# Patient Record
Sex: FEMALE | Race: WHITE | NOT HISPANIC OR LATINO | Employment: OTHER | URBAN - METROPOLITAN AREA
[De-identification: names, ages, dates, MRNs, and addresses within clinical notes are randomized per-mention and may not be internally consistent; named-entity substitution may affect disease eponyms.]

---

## 2023-03-04 ENCOUNTER — DOCUMENTATION (OUTPATIENT)
Dept: PRIMARY CARE | Facility: CLINIC | Age: 88
End: 2023-03-04

## 2023-03-04 ASSESSMENT — ENCOUNTER SYMPTOMS
SWEATS: 0
ORTHOPNEA: 0
NECK PAIN: 0
HYPERTENSION: 1

## 2023-03-04 NOTE — PROGRESS NOTES
This encounter was created in error - please disregard.  Subjective   Chief complaint: Liseth Briggs is a 89 y.o. female who is a long term resident patient being seen and evaluated for multiple medical problems.  Patient presents for     HPI:  Hypertension  Pertinent negatives include no neck pain, orthopnea or sweats.       Review of Systems   Cardiovascular:  Negative for orthopnea.   Musculoskeletal:  Negative for neck pain.     All systems reviewed and negative except for what was mentioned in the HPI    Vital signs:      Objective   Physical Exam  Constitutional:       General: She is not in acute distress.  Eyes:      Extraocular Movements: Extraocular movements intact.   Cardiovascular:      Rate and Rhythm: Regular rhythm.   Pulmonary:      Effort: Pulmonary effort is normal.      Breath sounds: Normal breath sounds.   Abdominal:      General: Bowel sounds are normal.      Palpations: Abdomen is soft.   Musculoskeletal:      Cervical back: Neck supple.      Right lower le+ Pitting Edema present.      Left lower le+ Pitting Edema present.   Neurological:      Mental Status: She is alert.   Psychiatric:         Mood and Affect: Mood normal.         Behavior: Behavior is cooperative.         Assessment/Plan   Problem List Items Addressed This Visit    None    Medications, treatments, and labs reviewed  Continue medications and treatments as listed in PCC

## 2023-03-22 ENCOUNTER — NURSING HOME VISIT (OUTPATIENT)
Dept: POST ACUTE CARE | Facility: EXTERNAL LOCATION | Age: 88
End: 2023-03-22
Payer: MEDICARE

## 2023-03-22 DIAGNOSIS — I10 HYPERTENSION, UNSPECIFIED TYPE: ICD-10-CM

## 2023-03-22 DIAGNOSIS — F32.A DEPRESSION, UNSPECIFIED DEPRESSION TYPE: ICD-10-CM

## 2023-03-22 DIAGNOSIS — F03.918 DEMENTIA WITH OTHER BEHAVIORAL DISTURBANCE, UNSPECIFIED DEMENTIA SEVERITY, UNSPECIFIED DEMENTIA TYPE (MULTI): ICD-10-CM

## 2023-03-22 PROBLEM — F03.90 DEMENTIA (MULTI): Status: ACTIVE | Noted: 2023-03-22

## 2023-03-22 PROCEDURE — 99309 SBSQ NF CARE MODERATE MDM 30: CPT | Performed by: INTERNAL MEDICINE

## 2023-03-22 NOTE — PROGRESS NOTES
Subjective   Chief complaint: Liseth Briggs is a 89 y.o. female who is a long term resident patient being seen and evaluated for multiple medical problems.  Patient presents for chronic illness management    HPI:  Patient presents for general medical care and f/u.  Patient seen and examined at bedside.  No issues per nursing.  Patient has no acute complaints. Patient with dementia requires assistance for ADLs and mobility. Denies feeling down or thoughts of harming self or others. Denies headache or chest pain. No acute distress.           Review of Systems  All systems reviewed and negative except for what was mentioned in the HPI    Vital signs: 121/72, 67, 17, 95%    Objective   Physical Exam  Constitutional:       General: She is not in acute distress.  Eyes:      Extraocular Movements: Extraocular movements intact.   Cardiovascular:      Rate and Rhythm: Regular rhythm.   Pulmonary:      Effort: Pulmonary effort is normal.      Breath sounds: Normal breath sounds.   Abdominal:      General: Bowel sounds are normal.      Palpations: Abdomen is soft.   Musculoskeletal:      Cervical back: Neck supple.      Right lower leg: No edema.      Left lower leg: No edema.   Neurological:      Mental Status: She is alert.   Psychiatric:         Mood and Affect: Mood normal.         Behavior: Behavior is cooperative.         Assessment/Plan   Problem List Items Addressed This Visit          Nervous    Dementia (CMS/HCC)     Continue Seroquel            Circulatory    HTN (hypertension)     Monitor BP   Continue antihypertensives            Other    Depression     Mood stable  Continue Zoloft          Medications, treatments, and labs reviewed  Continue medications and treatments as listed in PCC    Scribe Attestation  By signing my name below, Lelo FORD, Scribdestini   attest that this documentation has been prepared under the direction and in the presence of Velasquez Talley MD.    Provider Attestation - Scribe  documentation  All medical record entries made by the Scribe were at my direction and personally dictated by me. I have reviewed the chart and agree that the record accurately reflects my personal performance of the history, physical exam, discussion and plan.

## 2023-03-22 NOTE — LETTER
Patient: Liseth Briggs  : 1920    Encounter Date: 2023    Subjective  Chief complaint: Liseth Briggs is a 89 y.o. female who is a long term resident patient being seen and evaluated for multiple medical problems.  Patient presents for chronic illness management    HPI:  Patient presents for general medical care and f/u.  Patient seen and examined at bedside.  No issues per nursing.  Patient has no acute complaints. Patient with dementia requires assistance for ADLs and mobility. Denies feeling down or thoughts of harming self or others. Denies headache or chest pain. No acute distress.           Review of Systems  All systems reviewed and negative except for what was mentioned in the HPI    Vital signs: 121/72, 67, 17, 95%    Objective  Physical Exam  Constitutional:       General: She is not in acute distress.  Eyes:      Extraocular Movements: Extraocular movements intact.   Cardiovascular:      Rate and Rhythm: Regular rhythm.   Pulmonary:      Effort: Pulmonary effort is normal.      Breath sounds: Normal breath sounds.   Abdominal:      General: Bowel sounds are normal.      Palpations: Abdomen is soft.   Musculoskeletal:      Cervical back: Neck supple.      Right lower leg: No edema.      Left lower leg: No edema.   Neurological:      Mental Status: She is alert.   Psychiatric:         Mood and Affect: Mood normal.         Behavior: Behavior is cooperative.         Assessment/Plan  Problem List Items Addressed This Visit          Nervous    Dementia (CMS/HCC)     Continue Seroquel            Circulatory    HTN (hypertension)     Monitor BP   Continue antihypertensives            Other    Depression     Mood stable  Continue Zoloft          Medications, treatments, and labs reviewed  Continue medications and treatments as listed in PCC    Scribe Attestation  By signing my name below, ILelo, Scribe   attest that this documentation has been prepared under the direction and in the presence  of Velasquez Talley MD.    Provider Attestation - Scribe documentation  All medical record entries made by the Scribe were at my direction and personally dictated by me. I have reviewed the chart and agree that the record accurately reflects my personal performance of the history, physical exam, discussion and plan.      Electronically Signed By: Velasquez Talley MD   3/22/23  5:37 PM

## 2023-04-26 ENCOUNTER — NURSING HOME VISIT (OUTPATIENT)
Dept: POST ACUTE CARE | Facility: EXTERNAL LOCATION | Age: 88
End: 2023-04-26
Payer: MEDICARE

## 2023-04-26 DIAGNOSIS — F32.A DEPRESSION, UNSPECIFIED DEPRESSION TYPE: ICD-10-CM

## 2023-04-26 DIAGNOSIS — F03.918 DEMENTIA WITH OTHER BEHAVIORAL DISTURBANCE, UNSPECIFIED DEMENTIA SEVERITY, UNSPECIFIED DEMENTIA TYPE (MULTI): ICD-10-CM

## 2023-04-26 DIAGNOSIS — I10 HYPERTENSION, UNSPECIFIED TYPE: ICD-10-CM

## 2023-04-26 PROCEDURE — 99309 SBSQ NF CARE MODERATE MDM 30: CPT | Performed by: INTERNAL MEDICINE

## 2023-04-26 NOTE — PROGRESS NOTES
Subjective   Chief complaint: Liseth Briggs is a 89 y.o. female who is a long term resident patient being seen and evaluated for multiple medical problems.  Patient presents for chronic illness management    HPI:  Patient presents for general medical care and f/u.  Patient seen and examined at bedside.  No issues per nursing.  Patient has no acute complaints. Patient with dementia requires assistance for ADLs and mobility. Denies feeling down or thoughts of harming self or others.  patient with diagnosis of depression.  Mood is stable.  Denies feeling down and thoughts of harming self or others. Denies headache or chest pain. No acute distress.           Review of Systems  All systems reviewed and negative except for what was mentioned in the HPI    Vital signs: 124/72, 96%    Objective   Physical Exam  Constitutional:       General: She is not in acute distress.  Eyes:      Extraocular Movements: Extraocular movements intact.   Cardiovascular:      Rate and Rhythm: Normal rate and regular rhythm.   Pulmonary:      Effort: Pulmonary effort is normal.      Breath sounds: Normal breath sounds.   Abdominal:      General: Bowel sounds are normal.      Palpations: Abdomen is soft.   Musculoskeletal:      Cervical back: Neck supple.      Right lower leg: No edema.      Left lower leg: No edema.   Neurological:      Mental Status: She is alert.   Psychiatric:         Mood and Affect: Mood normal.         Behavior: Behavior is cooperative.         Assessment/Plan   Problem List Items Addressed This Visit          Nervous    Dementia (CMS/[facility])     Continue Seroquel            Circulatory    HTN (hypertension)     Monitor BP   Continue antihypertensives            Other    Depression     Mood stable  Continue Zoloft        Medications, treatments, and labs reviewed  Continue medications and treatments as listed in PCC    Scribe Attestation  By signing my name below, ILelo, Scribe   attest that this documentation has  been prepared under the direction and in the presence of Velasquez Talley MD.    Provider Attestation - Scribe documentation  All medical record entries made by the Scribe were at my direction and personally dictated by me. I have reviewed the chart and agree that the record accurately reflects my personal performance of the history, physical exam, discussion and plan.

## 2023-04-26 NOTE — LETTER
Patient: Liseth Briggs  : 1920    Encounter Date: 2023    Subjective  Chief complaint: Liseth Briggs is a 89 y.o. female who is a long term resident patient being seen and evaluated for multiple medical problems.  Patient presents for chronic illness management    HPI:  Patient presents for general medical care and f/u.  Patient seen and examined at bedside.  No issues per nursing.  Patient has no acute complaints. Patient with dementia requires assistance for ADLs and mobility. Denies feeling down or thoughts of harming self or others.  patient with diagnosis of depression.  Mood is stable.  Denies feeling down and thoughts of harming self or others. Denies headache or chest pain. No acute distress.           Review of Systems  All systems reviewed and negative except for what was mentioned in the HPI    Vital signs: 124/72, 96%    Objective  Physical Exam  Constitutional:       General: She is not in acute distress.  Eyes:      Extraocular Movements: Extraocular movements intact.   Cardiovascular:      Rate and Rhythm: Normal rate and regular rhythm.   Pulmonary:      Effort: Pulmonary effort is normal.      Breath sounds: Normal breath sounds.   Abdominal:      General: Bowel sounds are normal.      Palpations: Abdomen is soft.   Musculoskeletal:      Cervical back: Neck supple.      Right lower leg: No edema.      Left lower leg: No edema.   Neurological:      Mental Status: She is alert.   Psychiatric:         Mood and Affect: Mood normal.         Behavior: Behavior is cooperative.         Assessment/Plan  Problem List Items Addressed This Visit          Nervous    Dementia (CMS/HCC)     Continue Seroquel            Circulatory    HTN (hypertension)     Monitor BP   Continue antihypertensives            Other    Depression     Mood stable  Continue Zoloft        Medications, treatments, and labs reviewed  Continue medications and treatments as listed in PCC    Scribe Attestation  By signing my name  below, I, Clemente Bishopibdestini   attest that this documentation has been prepared under the direction and in the presence of Velasquez Talley MD.    Provider Attestation - Scribe documentation  All medical record entries made by the Scribe were at my direction and personally dictated by me. I have reviewed the chart and agree that the record accurately reflects my personal performance of the history, physical exam, discussion and plan.      Electronically Signed By: Velasquez Talley MD   4/26/23  6:01 PM

## 2023-05-24 ENCOUNTER — NURSING HOME VISIT (OUTPATIENT)
Dept: POST ACUTE CARE | Facility: EXTERNAL LOCATION | Age: 88
End: 2023-05-24
Payer: MEDICARE

## 2023-05-24 DIAGNOSIS — F32.A DEPRESSION, UNSPECIFIED DEPRESSION TYPE: ICD-10-CM

## 2023-05-24 DIAGNOSIS — F03.918 DEMENTIA WITH OTHER BEHAVIORAL DISTURBANCE, UNSPECIFIED DEMENTIA SEVERITY, UNSPECIFIED DEMENTIA TYPE (MULTI): ICD-10-CM

## 2023-05-24 PROCEDURE — 99309 SBSQ NF CARE MODERATE MDM 30: CPT | Performed by: INTERNAL MEDICINE

## 2023-05-24 NOTE — LETTER
Patient: Liseth Briggs  : 1920    Encounter Date: 2023    Subjective  Chief complaint: Liseth Briggs is a 89 y.o. female who is a long term resident patient being seen and evaluated for multiple medical problems.  Patient presents for chronic illness management    HPI:  Patient presents for general medical care and f/u.  Patient seen and examined at bedside.  No issues per nursing.  Patient has no acute complaints. Patient with dementia requires assistance for ADLs and mobility. Denies feeling down or thoughts of harming self or others.  patient with diagnosis of depression.  Mood is stable.  Denies feeling down and thoughts of harming self or others. Denies headache or chest pain. No acute distress.           Review of Systems  All systems reviewed and negative except for what was mentioned in the HPI    Vital signs: 126/74, 96%    Objective  Physical Exam  Constitutional:       General: She is not in acute distress.  Eyes:      Extraocular Movements: Extraocular movements intact.   Cardiovascular:      Rate and Rhythm: Normal rate and regular rhythm.   Pulmonary:      Effort: Pulmonary effort is normal.      Breath sounds: Normal breath sounds.   Abdominal:      General: Bowel sounds are normal.      Palpations: Abdomen is soft.   Musculoskeletal:      Cervical back: Neck supple.      Right lower leg: No edema.      Left lower leg: No edema.   Neurological:      Mental Status: She is alert.   Psychiatric:         Mood and Affect: Mood normal.         Behavior: Behavior is cooperative.         Assessment/Plan  Problem List Items Addressed This Visit          Nervous    Dementia (CMS/HCC)     Continue Seroquel  Continue to monitor for changes in mentation and behaviors            Other    Depression     Mood stable  Continue Zoloft        Medications, treatments, and labs reviewed  Continue medications and treatments as listed in PCC    Scribe Attestation  By signing my name below, I, Lelo Shafer,  Scribe   attest that this documentation has been prepared under the direction and in the presence of Velasquez Talley MD.    Provider Attestation - Scribe documentation  All medical record entries made by the Scribe were at my direction and personally dictated by me. I have reviewed the chart and agree that the record accurately reflects my personal performance of the history, physical exam, discussion and plan.      Electronically Signed By: Velasquez Talley MD   5/25/23  2:46 PM

## 2023-05-25 NOTE — PROGRESS NOTES
Subjective   Chief complaint: Liseth Briggs is a 89 y.o. female who is a long term resident patient being seen and evaluated for multiple medical problems.  Patient presents for chronic illness management    HPI:  Patient presents for general medical care and f/u.  Patient seen and examined at bedside.  No issues per nursing.  Patient has no acute complaints. Patient with dementia requires assistance for ADLs and mobility. Denies feeling down or thoughts of harming self or others.  patient with diagnosis of depression.  Mood is stable.  Denies feeling down and thoughts of harming self or others. Denies headache or chest pain. No acute distress.           Review of Systems  All systems reviewed and negative except for what was mentioned in the HPI    Vital signs: 126/74, 96%    Objective   Physical Exam  Constitutional:       General: She is not in acute distress.  Eyes:      Extraocular Movements: Extraocular movements intact.   Cardiovascular:      Rate and Rhythm: Normal rate and regular rhythm.   Pulmonary:      Effort: Pulmonary effort is normal.      Breath sounds: Normal breath sounds.   Abdominal:      General: Bowel sounds are normal.      Palpations: Abdomen is soft.   Musculoskeletal:      Cervical back: Neck supple.      Right lower leg: No edema.      Left lower leg: No edema.   Neurological:      Mental Status: She is alert.   Psychiatric:         Mood and Affect: Mood normal.         Behavior: Behavior is cooperative.         Assessment/Plan   Problem List Items Addressed This Visit          Nervous    Dementia (CMS/[facility])     Continue Seroquel  Continue to monitor for changes in mentation and behaviors            Other    Depression     Mood stable  Continue Zoloft        Medications, treatments, and labs reviewed  Continue medications and treatments as listed in PCC    Scribe Attestation  By signing my name below, ILelo, Scribdestini   attest that this documentation has been prepared under the  direction and in the presence of Velasquez Talley MD.    Provider Attestation - Scribe documentation  All medical record entries made by the Scribe were at my direction and personally dictated by me. I have reviewed the chart and agree that the record accurately reflects my personal performance of the history, physical exam, discussion and plan.

## 2023-06-23 ENCOUNTER — NURSING HOME VISIT (OUTPATIENT)
Dept: POST ACUTE CARE | Facility: EXTERNAL LOCATION | Age: 88
End: 2023-06-23
Payer: MEDICARE

## 2023-06-23 DIAGNOSIS — F32.A DEPRESSION, UNSPECIFIED DEPRESSION TYPE: ICD-10-CM

## 2023-06-23 DIAGNOSIS — I10 HYPERTENSION, UNSPECIFIED TYPE: ICD-10-CM

## 2023-06-23 DIAGNOSIS — F03.918 DEMENTIA WITH OTHER BEHAVIORAL DISTURBANCE, UNSPECIFIED DEMENTIA SEVERITY, UNSPECIFIED DEMENTIA TYPE (MULTI): ICD-10-CM

## 2023-06-23 PROCEDURE — 99309 SBSQ NF CARE MODERATE MDM 30: CPT | Performed by: INTERNAL MEDICINE

## 2023-06-23 NOTE — LETTER
Patient: Liseth Briggs  : 1920    Encounter Date: 2023    Subjective  Chief complaint: Liseth Briggs is a 89 y.o. female who is a long term resident patient being seen and evaluated for multiple medical problems.  Patient presents for chronic illness management    HPI:  Patient presents for general medical care and f/u.  Patient seen and examined at bedside.  No issues per nursing.  Patient has no acute complaints. Patient with dementia requires assistance for ADLs and mobility. patient with diagnosis of depression.  Denies feeling down and thoughts of harming self or others. Denies headache or chest pain. No acute distress.           Review of Systems  All systems reviewed and negative except for what was mentioned in the HPI    Vital signs: 122/74, 97%    Objective  Physical Exam  Constitutional:       General: She is not in acute distress.  Eyes:      Extraocular Movements: Extraocular movements intact.   Cardiovascular:      Rate and Rhythm: Normal rate and regular rhythm.   Pulmonary:      Effort: Pulmonary effort is normal.      Breath sounds: Normal breath sounds.   Abdominal:      General: Bowel sounds are normal.      Palpations: Abdomen is soft.   Musculoskeletal:      Cervical back: Neck supple.      Right lower leg: No edema.      Left lower leg: No edema.   Neurological:      Mental Status: She is alert.   Psychiatric:         Mood and Affect: Mood normal.         Behavior: Behavior is cooperative.         Assessment/Plan  Problem List Items Addressed This Visit          Nervous    Dementia (CMS/HCC)     Continue Seroquel  Continue to monitor for changes in mentation and behaviors            Circulatory    HTN (hypertension)     Monitor BP   Continue antihypertensives            Other    Depression     Mood stable  Continue Zoloft        Medications, treatments, and labs reviewed  Continue medications and treatments as listed in PCC    Scribe Attestation  By signing my name below, Lelo FORD  Aisha Shafer   attest that this documentation has been prepared under the direction and in the presence of Velasquez Talley MD.    Provider Attestation - Scribe documentation  All medical record entries made by the Scribe were at my direction and personally dictated by me. I have reviewed the chart and agree that the record accurately reflects my personal performance of the history, physical exam, discussion and plan.      Electronically Signed By: Velasquez Talley MD   6/23/23  5:45 PM

## 2023-06-23 NOTE — PROGRESS NOTES
Subjective   Chief complaint: Liseth Briggs is a 89 y.o. female who is a long term resident patient being seen and evaluated for multiple medical problems.  Patient presents for chronic illness management    HPI:  Patient presents for general medical care and f/u.  Patient seen and examined at bedside.  No issues per nursing.  Patient has no acute complaints. Patient with dementia requires assistance for ADLs and mobility. patient with diagnosis of depression.  Denies feeling down and thoughts of harming self or others. Denies headache or chest pain. No acute distress.           Review of Systems  All systems reviewed and negative except for what was mentioned in the HPI    Vital signs: 122/74, 97%    Objective   Physical Exam  Constitutional:       General: She is not in acute distress.  Eyes:      Extraocular Movements: Extraocular movements intact.   Cardiovascular:      Rate and Rhythm: Normal rate and regular rhythm.   Pulmonary:      Effort: Pulmonary effort is normal.      Breath sounds: Normal breath sounds.   Abdominal:      General: Bowel sounds are normal.      Palpations: Abdomen is soft.   Musculoskeletal:      Cervical back: Neck supple.      Right lower leg: No edema.      Left lower leg: No edema.   Neurological:      Mental Status: She is alert.   Psychiatric:         Mood and Affect: Mood normal.         Behavior: Behavior is cooperative.         Assessment/Plan   Problem List Items Addressed This Visit          Nervous    Dementia (CMS/HCC)     Continue Seroquel  Continue to monitor for changes in mentation and behaviors            Circulatory    HTN (hypertension)     Monitor BP   Continue antihypertensives            Other    Depression     Mood stable  Continue Zoloft        Medications, treatments, and labs reviewed  Continue medications and treatments as listed in PCC    Scribe Attestation  By signing my name below, ILelo, Scribe   attest that this documentation has been prepared  under the direction and in the presence of Velasquez Talley MD.    Provider Attestation - Scribe documentation  All medical record entries made by the Scribe were at my direction and personally dictated by me. I have reviewed the chart and agree that the record accurately reflects my personal performance of the history, physical exam, discussion and plan.

## 2023-07-24 ENCOUNTER — NURSING HOME VISIT (OUTPATIENT)
Dept: POST ACUTE CARE | Facility: EXTERNAL LOCATION | Age: 88
End: 2023-07-24
Payer: COMMERCIAL

## 2023-07-24 DIAGNOSIS — F01.C11 SEVERE VASCULAR DEMENTIA WITH AGITATION (MULTI): Primary | ICD-10-CM

## 2023-07-24 DIAGNOSIS — F32.A DEPRESSION, UNSPECIFIED DEPRESSION TYPE: ICD-10-CM

## 2023-07-24 DIAGNOSIS — I10 HYPERTENSION, ESSENTIAL: ICD-10-CM

## 2023-07-24 PROCEDURE — 99308 SBSQ NF CARE LOW MDM 20: CPT | Performed by: NURSE PRACTITIONER

## 2023-07-24 NOTE — LETTER
Patient: Liseth Briggs  : 1920    Encounter Date: 2023    PROGRESS NOTE    Subjective  Chief complaint: Liseth Briggs is a 89 y.o. female who is a long term care patient being seen and evaluated for follow-up of dementia with behaviors and depression.    HPI:  I asked to see patient to evaluate continued use of Seroquel and Zoloft.  Patient with Dx of dementia with behaviors and depression.  Mood has been stable. Symptoms controlled.  Patient denies feeling anxious or down.  Denies thoughts of harming self or others.           Objective    Physical Exam  Constitutional:       General: She is not in acute distress.  HENT:      Ears:      Comments: [place]  Eyes:      Comments: Blind   Cardiovascular:      Rate and Rhythm: Normal rate.   Pulmonary:      Effort: Pulmonary effort is normal.   Musculoskeletal:      Cervical back: Neck supple.   Neurological:      Mental Status: She is alert.   Psychiatric:         Mood and Affect: Mood normal.         Behavior: Behavior is cooperative.         Assessment/Plan  Problem List Items Addressed This Visit       Depression     GDR contraindicated, may cause return/worsening of symptoms  Continue current meds         Hypertension, essential     BP usually 130s systolic  Continue antihypertensives  AMIE diet  Monitor BP          Severe vascular dementia with agitation (CMS/[facility]) - Primary     Secured unit for safety  Continue current meds          Medications, treatments, and labs reviewed  Continue medications and treatments as listed in EMR    Scribe Attestation  ILilibeth Scribe   attest that this documentation has been prepared under the direction and in the presence of TERRANCE Timmons-CNP    Provider Attestation - Scribe documentation  All medical record entries made by the Scribe were at my direction and personally dictated by me. I have reviewed the chart and agree that the record accurately reflects my personal performance of the history, physical exam,  discussion and plan.   PETRA Timmons        Electronically Signed By: PETRA Timmons   7/29/23  8:15 PM

## 2023-07-24 NOTE — PROGRESS NOTES
PROGRESS NOTE    Subjective   Chief complaint: Liseth Briggs is a 89 y.o. female who is a long term care patient being seen and evaluated for follow-up of dementia with behaviors and depression.    HPI:  I asked to see patient to evaluate continued use of Seroquel and Zoloft.  Patient with Dx of dementia with behaviors and depression.  Mood has been stable. Symptoms controlled.  Patient denies feeling anxious or down.  Denies thoughts of harming self or others.           Objective     Physical Exam  Constitutional:       General: She is not in acute distress.  HENT:      Ears:      Comments: [place]  Eyes:      Comments: Blind   Cardiovascular:      Rate and Rhythm: Normal rate.   Pulmonary:      Effort: Pulmonary effort is normal.   Musculoskeletal:      Cervical back: Neck supple.   Neurological:      Mental Status: She is alert.   Psychiatric:         Mood and Affect: Mood normal.         Behavior: Behavior is cooperative.         Assessment/Plan   Problem List Items Addressed This Visit       Depression     GDR contraindicated, may cause return/worsening of symptoms  Continue current meds         Hypertension, essential     BP usually 130s systolic  Continue antihypertensives  AMIE diet  Monitor BP          Severe vascular dementia with agitation (CMS/[facility]) - Primary     Secured unit for safety  Continue current meds          Medications, treatments, and labs reviewed  Continue medications and treatments as listed in EMR    Scribe Attestation  ILilibeth Scribe   attest that this documentation has been prepared under the direction and in the presence of PETRA Timmons    Provider Attestation - Scribe documentation  All medical record entries made by the Scribe were at my direction and personally dictated by me. I have reviewed the chart and agree that the record accurately reflects my personal performance of the history, physical exam, discussion and plan.   PETRA Timmons

## 2023-07-26 ENCOUNTER — NURSING HOME VISIT (OUTPATIENT)
Dept: POST ACUTE CARE | Facility: EXTERNAL LOCATION | Age: 88
End: 2023-07-26
Payer: COMMERCIAL

## 2023-07-26 DIAGNOSIS — I10 HYPERTENSION, UNSPECIFIED TYPE: ICD-10-CM

## 2023-07-26 DIAGNOSIS — F03.918 DEMENTIA WITH OTHER BEHAVIORAL DISTURBANCE, UNSPECIFIED DEMENTIA SEVERITY, UNSPECIFIED DEMENTIA TYPE (MULTI): Primary | ICD-10-CM

## 2023-07-26 DIAGNOSIS — F32.A DEPRESSION, UNSPECIFIED DEPRESSION TYPE: ICD-10-CM

## 2023-07-26 PROCEDURE — 99309 SBSQ NF CARE MODERATE MDM 30: CPT | Performed by: INTERNAL MEDICINE

## 2023-07-26 NOTE — PROGRESS NOTES
Subjective   Chief complaint: Liseth Briggs is a 89 y.o. female who is a long term resident patient being seen and evaluated for multiple medical problems.  Patient presents for chronic illness management    HPI:  Patient presents for general medical care and f/u.  Patient seen and examined at bedside.  No issues per nursing.  Patient has no acute complaints. Patient with dementia requires assistance for ADLs and mobility. patient with diagnosis of depression.  Denies feeling down and thoughts of harming self or others. Denies headache or chest pain. No acute distress.           Review of Systems  All systems reviewed and negative except for what was mentioned in the HPI    Vital signs: 126/74, 97%    Objective   Physical Exam  Constitutional:       General: She is not in acute distress.  Eyes:      Extraocular Movements: Extraocular movements intact.   Cardiovascular:      Rate and Rhythm: Normal rate and regular rhythm.   Pulmonary:      Effort: Pulmonary effort is normal.      Breath sounds: Normal breath sounds.   Abdominal:      General: Bowel sounds are normal.      Palpations: Abdomen is soft.   Musculoskeletal:      Cervical back: Neck supple.      Right lower leg: No edema.      Left lower leg: No edema.   Neurological:      Mental Status: She is alert.   Psychiatric:         Mood and Affect: Mood normal.         Behavior: Behavior is cooperative.         Assessment/Plan   Problem List Items Addressed This Visit          Cardiac and Vasculature    HTN (hypertension)     Monitor BP   Continue antihypertensives            Mental Health    Depression     Mood stable  Continue Zoloft            Neuro    Dementia (CMS/[facility]) - Primary     Continue Seroquel  Continue to monitor for changes in mentation and behaviors        Medications, treatments, and labs reviewed  Continue medications and treatments as listed in PCC    Scribe Attestation  By signing my name below, I, Lelo Shafer, Scribe   attest that this  documentation has been prepared under the direction and in the presence of Velasquez Talley MD.    Provider Attestation - Scribe documentation  All medical record entries made by the Scribe were at my direction and personally dictated by me. I have reviewed the chart and agree that the record accurately reflects my personal performance of the history, physical exam, discussion and plan.

## 2023-07-26 NOTE — LETTER
Patient: Liseth Briggs  : 1920    Encounter Date: 2023    Subjective  Chief complaint: Liseth Briggs is a 89 y.o. female who is a long term resident patient being seen and evaluated for multiple medical problems.  Patient presents for chronic illness management    HPI:  Patient presents for general medical care and f/u.  Patient seen and examined at bedside.  No issues per nursing.  Patient has no acute complaints. Patient with dementia requires assistance for ADLs and mobility. patient with diagnosis of depression.  Denies feeling down and thoughts of harming self or others. Denies headache or chest pain. No acute distress.           Review of Systems  All systems reviewed and negative except for what was mentioned in the HPI    Vital signs: 126/74, 97%    Objective  Physical Exam  Constitutional:       General: She is not in acute distress.  Eyes:      Extraocular Movements: Extraocular movements intact.   Cardiovascular:      Rate and Rhythm: Normal rate and regular rhythm.   Pulmonary:      Effort: Pulmonary effort is normal.      Breath sounds: Normal breath sounds.   Abdominal:      General: Bowel sounds are normal.      Palpations: Abdomen is soft.   Musculoskeletal:      Cervical back: Neck supple.      Right lower leg: No edema.      Left lower leg: No edema.   Neurological:      Mental Status: She is alert.   Psychiatric:         Mood and Affect: Mood normal.         Behavior: Behavior is cooperative.         Assessment/Plan  Problem List Items Addressed This Visit          Cardiac and Vasculature    HTN (hypertension)     Monitor BP   Continue antihypertensives            Mental Health    Depression     Mood stable  Continue Zoloft            Neuro    Dementia (CMS/[facility]) - Primary     Continue Seroquel  Continue to monitor for changes in mentation and behaviors        Medications, treatments, and labs reviewed  Continue medications and treatments as listed in PCC    Scribe Attestation  By  signing my name below, I, Aisha Bishop   attest that this documentation has been prepared under the direction and in the presence of Velasquez Talley MD.    Provider Attestation - Scribe documentation  All medical record entries made by the Scribe were at my direction and personally dictated by me. I have reviewed the chart and agree that the record accurately reflects my personal performance of the history, physical exam, discussion and plan.      Electronically Signed By: Velasquez Talley MD   7/26/23  4:45 PM

## 2023-07-29 PROBLEM — F02.C11 SEVERE LATE ONSET ALZHEIMER'S DEMENTIA WITH AGITATION (MULTI): Status: ACTIVE | Noted: 2023-03-22

## 2023-07-29 PROBLEM — G30.1 SEVERE LATE ONSET ALZHEIMER'S DEMENTIA WITH AGITATION (MULTI): Status: ACTIVE | Noted: 2023-03-22

## 2023-07-29 PROBLEM — F01.C11 SEVERE VASCULAR DEMENTIA WITH AGITATION (MULTI): Status: ACTIVE | Noted: 2023-03-22

## 2023-08-23 ENCOUNTER — NURSING HOME VISIT (OUTPATIENT)
Dept: POST ACUTE CARE | Facility: EXTERNAL LOCATION | Age: 88
End: 2023-08-23
Payer: COMMERCIAL

## 2023-08-23 DIAGNOSIS — F01.C11 SEVERE VASCULAR DEMENTIA WITH AGITATION (MULTI): Primary | ICD-10-CM

## 2023-08-23 DIAGNOSIS — F32.A DEPRESSION, UNSPECIFIED DEPRESSION TYPE: ICD-10-CM

## 2023-08-23 DIAGNOSIS — I10 HYPERTENSION, ESSENTIAL: ICD-10-CM

## 2023-08-23 PROCEDURE — 99309 SBSQ NF CARE MODERATE MDM 30: CPT | Performed by: INTERNAL MEDICINE

## 2023-08-23 NOTE — LETTER
Patient: Liseth Briggs  : 1920    Encounter Date: 2023    Subjective  Chief complaint: Liseth Briggs is a 89 y.o. female who is a long term resident patient being seen and evaluated for multiple medical problems.  Patient presents for chronic illness management    HPI:  Patient presents for general medical care and f/u.  Patient seen and examined at bedside.  No issues per nursing.  Patient has no acute complaints. Patient with dementia requires assistance for ADLs and mobility. patient with diagnosis of depression.  Denies feeling down and thoughts of harming self or others. Denies headache or chest pain. No acute distress.           Review of Systems  All systems reviewed and negative except for what was mentioned in the HPI    Vital signs: 126/74, 97%    Objective  Physical Exam  Constitutional:       General: She is not in acute distress.  Eyes:      Extraocular Movements: Extraocular movements intact.   Cardiovascular:      Rate and Rhythm: Normal rate and regular rhythm.   Pulmonary:      Effort: Pulmonary effort is normal.      Breath sounds: Normal breath sounds.   Abdominal:      General: Bowel sounds are normal.      Palpations: Abdomen is soft.   Musculoskeletal:      Cervical back: Neck supple.      Right lower leg: No edema.      Left lower leg: No edema.   Neurological:      Mental Status: She is alert.   Psychiatric:         Mood and Affect: Mood normal.         Behavior: Behavior is cooperative.         Assessment/Plan  Problem List Items Addressed This Visit       Severe vascular dementia with agitation (CMS/HCC) - Primary     Secured unit for safety  Continue current meds         Hypertension, essential     BP usually 130s systolic  Continue antihypertensives  AMIE diet  Monitor BP          Depression     Mood stable  Continue current meds        Medications, treatments, and labs reviewed  Continue medications and treatments as listed in PCC    Scribe Attestation  By signing my name  below, I, Aisha Bishop   attest that this documentation has been prepared under the direction and in the presence of Velasquez Talley MD.    Provider Attestation - Scribe documentation  All medical record entries made by the Scribe were at my direction and personally dictated by me. I have reviewed the chart and agree that the record accurately reflects my personal performance of the history, physical exam, discussion and plan.  1. Severe vascular dementia with agitation (CMS/HCC)        2. Hypertension, essential        3. Depression, unspecified depression type              Electronically Signed By: Velasquez Talley MD   8/28/23  3:25 PM

## 2023-08-28 NOTE — PROGRESS NOTES
Subjective   Chief complaint: Liseth Briggs is a 89 y.o. female who is a long term resident patient being seen and evaluated for multiple medical problems.  Patient presents for chronic illness management    HPI:  Patient presents for general medical care and f/u.  Patient seen and examined at bedside.  No issues per nursing.  Patient has no acute complaints. Patient with dementia requires assistance for ADLs and mobility. patient with diagnosis of depression.  Denies feeling down and thoughts of harming self or others. Denies headache or chest pain. No acute distress.           Review of Systems  All systems reviewed and negative except for what was mentioned in the HPI    Vital signs: 126/74, 97%    Objective   Physical Exam  Constitutional:       General: She is not in acute distress.  Eyes:      Extraocular Movements: Extraocular movements intact.   Cardiovascular:      Rate and Rhythm: Normal rate and regular rhythm.   Pulmonary:      Effort: Pulmonary effort is normal.      Breath sounds: Normal breath sounds.   Abdominal:      General: Bowel sounds are normal.      Palpations: Abdomen is soft.   Musculoskeletal:      Cervical back: Neck supple.      Right lower leg: No edema.      Left lower leg: No edema.   Neurological:      Mental Status: She is alert.   Psychiatric:         Mood and Affect: Mood normal.         Behavior: Behavior is cooperative.         Assessment/Plan   Problem List Items Addressed This Visit       Severe vascular dementia with agitation (CMS/HCC) - Primary     Secured unit for safety  Continue current meds         Hypertension, essential     BP usually 130s systolic  Continue antihypertensives  AMIE diet  Monitor BP          Depression     Mood stable  Continue current meds        Medications, treatments, and labs reviewed  Continue medications and treatments as listed in PCC    Scribe Attestation  By signing my name below, ILelo, Scribe   attest that this documentation has  been prepared under the direction and in the presence of Velasquez Talley MD.    Provider Attestation - Scribe documentation  All medical record entries made by the Scribe were at my direction and personally dictated by me. I have reviewed the chart and agree that the record accurately reflects my personal performance of the history, physical exam, discussion and plan.  1. Severe vascular dementia with agitation (CMS/HCC)        2. Hypertension, essential        3. Depression, unspecified depression type

## 2023-09-13 ENCOUNTER — NURSING HOME VISIT (OUTPATIENT)
Dept: POST ACUTE CARE | Facility: EXTERNAL LOCATION | Age: 88
End: 2023-09-13
Payer: COMMERCIAL

## 2023-09-13 DIAGNOSIS — F01.C11 SEVERE VASCULAR DEMENTIA WITH AGITATION (MULTI): Primary | ICD-10-CM

## 2023-09-13 DIAGNOSIS — I10 HYPERTENSION, ESSENTIAL: ICD-10-CM

## 2023-09-13 PROCEDURE — 99308 SBSQ NF CARE LOW MDM 20: CPT | Performed by: INTERNAL MEDICINE

## 2023-09-13 NOTE — ASSESSMENT & PLAN NOTE
BP usually 130s systolic  Continue antihypertensives  AMIE diet  Monitor BP   
Secured unit for safety  Continue current meds  Discontinue Seroquel due to increased lethargy according to family reports    
yes...

## 2023-09-13 NOTE — LETTER
Patient: Liseth Briggs  : 1920    Encounter Date: 2023    PROGRESS NOTE    Subjective  Chief complaint: Liseth Briggs is a 89 y.o. female who is a long term care patient being seen and evaluated for  general medical care    HPI:  Patient with dementia has been taking Seroquel for severe agitation. Family would like to have Seroquel discontinued because they feel it is making her too sleepy and lethargic. Denies chest pain or headache. No other concerns at this time. No acute distress.       Objective  Vital signs: 132/65, 98%    Physical Exam  Constitutional:       General: She is not in acute distress.  Eyes:      Extraocular Movements: Extraocular movements intact.   Cardiovascular:      Rate and Rhythm: Normal rate and regular rhythm.   Pulmonary:      Effort: Pulmonary effort is normal.      Breath sounds: Normal breath sounds.   Abdominal:      General: Bowel sounds are normal.      Palpations: Abdomen is soft.   Musculoskeletal:      Cervical back: Neck supple.      Right lower leg: No edema.      Left lower leg: No edema.   Neurological:      Mental Status: She is alert.   Psychiatric:         Mood and Affect: Mood normal.         Behavior: Behavior is cooperative.         Assessment/Plan  Problem List Items Addressed This Visit       Severe vascular dementia with agitation (CMS/HCC) - Primary     Secured unit for safety  Continue current meds  Discontinue Seroquel due to increased lethargy according to family reports           Hypertension, essential     BP usually 130s systolic  Continue antihypertensives  AMIE diet  Monitor BP           Medications, treatments, and labs reviewed  Continue medications and treatments as listed in PCC    Scribe Attestation  By signing my name below, ILelo, Scribe   attest that this documentation has been prepared under the direction and in the presence of Velasquez Talley MD.    Provider Attestation - Scribe documentation  All medical record entries made  by the Scribe were at my direction and personally dictated by me. I have reviewed the chart and agree that the record accurately reflects my personal performance of the history, physical exam, discussion and plan.    1. Severe vascular dementia with agitation (CMS/HCC)        2. Hypertension, essential               Electronically Signed By: Velasquez Talley MD   9/13/23  4:57 PM

## 2023-09-13 NOTE — PROGRESS NOTES
PROGRESS NOTE    Subjective   Chief complaint: Liseth Briggs is a 89 y.o. female who is a long term care patient being seen and evaluated for  general medical care    HPI:  Patient with dementia has been taking Seroquel for severe agitation. Family would like to have Seroquel discontinued because they feel it is making her too sleepy and lethargic. Denies chest pain or headache. No other concerns at this time. No acute distress.       Objective   Vital signs: 132/65, 98%    Physical Exam  Constitutional:       General: She is not in acute distress.  Eyes:      Extraocular Movements: Extraocular movements intact.   Cardiovascular:      Rate and Rhythm: Normal rate and regular rhythm.   Pulmonary:      Effort: Pulmonary effort is normal.      Breath sounds: Normal breath sounds.   Abdominal:      General: Bowel sounds are normal.      Palpations: Abdomen is soft.   Musculoskeletal:      Cervical back: Neck supple.      Right lower leg: No edema.      Left lower leg: No edema.   Neurological:      Mental Status: She is alert.   Psychiatric:         Mood and Affect: Mood normal.         Behavior: Behavior is cooperative.         Assessment/Plan   Problem List Items Addressed This Visit       Severe vascular dementia with agitation (CMS/HCC) - Primary     Secured unit for safety  Continue current meds  Discontinue Seroquel due to increased lethargy according to family reports           Hypertension, essential     BP usually 130s systolic  Continue antihypertensives  AMIE diet  Monitor BP           Medications, treatments, and labs reviewed  Continue medications and treatments as listed in PCC    Scribe Attestation  By signing my name below, Lelo FORD Scribe   attest that this documentation has been prepared under the direction and in the presence of Velasquez Talley MD.    Provider Attestation - Scribe documentation  All medical record entries made by the Scribe were at my direction and personally dictated by me. I  have reviewed the chart and agree that the record accurately reflects my personal performance of the history, physical exam, discussion and plan.    1. Severe vascular dementia with agitation (CMS/HCC)        2. Hypertension, essential

## 2023-09-27 ENCOUNTER — NURSING HOME VISIT (OUTPATIENT)
Dept: POST ACUTE CARE | Facility: EXTERNAL LOCATION | Age: 88
End: 2023-09-27
Payer: COMMERCIAL

## 2023-09-27 DIAGNOSIS — I10 HYPERTENSION, ESSENTIAL: ICD-10-CM

## 2023-09-27 DIAGNOSIS — F32.A DEPRESSION, UNSPECIFIED DEPRESSION TYPE: ICD-10-CM

## 2023-09-27 DIAGNOSIS — F01.C11 SEVERE VASCULAR DEMENTIA WITH AGITATION (MULTI): Primary | ICD-10-CM

## 2023-09-27 PROCEDURE — 99309 SBSQ NF CARE MODERATE MDM 30: CPT | Performed by: INTERNAL MEDICINE

## 2023-09-27 NOTE — PROGRESS NOTES
Subjective   Chief complaint: Liseth Briggs is a 89 y.o. female who is a long term resident patient being seen and evaluated for multiple medical problems.  Patient presents for chronic illness management    HPI:  Patient presents for general medical care and f/u.  Patient seen and examined at bedside.  No issues per nursing.  Patient has no acute complaints. Patient with dementia requires assistance for ADLs and mobility. patient with diagnosis of depression.  Denies feeling down and thoughts of harming self or others. Denies headache or chest pain. No acute distress.           Review of Systems  All systems reviewed and negative except for what was mentioned in the HPI    Vital signs: 134/76, 97%    Objective   Physical Exam  Constitutional:       General: She is not in acute distress.  Eyes:      Extraocular Movements: Extraocular movements intact.   Cardiovascular:      Rate and Rhythm: Normal rate and regular rhythm.   Pulmonary:      Effort: Pulmonary effort is normal.      Breath sounds: Normal breath sounds.   Abdominal:      General: Bowel sounds are normal.      Palpations: Abdomen is soft.   Musculoskeletal:      Cervical back: Neck supple.      Right lower leg: No edema.      Left lower leg: No edema.   Neurological:      Mental Status: She is alert.   Psychiatric:         Mood and Affect: Mood normal.         Behavior: Behavior is cooperative.         Assessment/Plan   Problem List Items Addressed This Visit       Severe vascular dementia with agitation (CMS/HCC) - Primary     Secured unit for safety  Continue current meds  Monitor for changes  Mentation at baseline currently           Hypertension, essential     BP usually 130s systolic  Continue antihypertensives  AMIE diet  Monitor BP          Depression     Mood stable  Continue current meds        Medications, treatments, and labs reviewed  Continue medications and treatments as listed in PCC    Scribe Attestation  By signing my name below, I,  Lelo Shafer Scribe   attest that this documentation has been prepared under the direction and in the presence of Velasquez Talley MD.    Provider Attestation - Scribe documentation  All medical record entries made by the Scribe were at my direction and personally dictated by me. I have reviewed the chart and agree that the record accurately reflects my personal performance of the history, physical exam, discussion and plan.  1. Severe vascular dementia with agitation (CMS/HCC)        2. Depression, unspecified depression type        3. Hypertension, essential

## 2023-09-27 NOTE — ASSESSMENT & PLAN NOTE
Secured unit for safety  Continue current meds  Monitor for changes  Mentation at baseline currently

## 2023-09-27 NOTE — LETTER
Patient: Liseth Briggs  : 1920    Encounter Date: 2023    Subjective  Chief complaint: Liseth Briggs is a 89 y.o. female who is a long term resident patient being seen and evaluated for multiple medical problems.  Patient presents for chronic illness management    HPI:  Patient presents for general medical care and f/u.  Patient seen and examined at bedside.  No issues per nursing.  Patient has no acute complaints. Patient with dementia requires assistance for ADLs and mobility. patient with diagnosis of depression.  Denies feeling down and thoughts of harming self or others. Denies headache or chest pain. No acute distress.           Review of Systems  All systems reviewed and negative except for what was mentioned in the HPI    Vital signs: 134/76, 97%    Objective  Physical Exam  Constitutional:       General: She is not in acute distress.  Eyes:      Extraocular Movements: Extraocular movements intact.   Cardiovascular:      Rate and Rhythm: Normal rate and regular rhythm.   Pulmonary:      Effort: Pulmonary effort is normal.      Breath sounds: Normal breath sounds.   Abdominal:      General: Bowel sounds are normal.      Palpations: Abdomen is soft.   Musculoskeletal:      Cervical back: Neck supple.      Right lower leg: No edema.      Left lower leg: No edema.   Neurological:      Mental Status: She is alert.   Psychiatric:         Mood and Affect: Mood normal.         Behavior: Behavior is cooperative.         Assessment/Plan  Problem List Items Addressed This Visit       Severe vascular dementia with agitation (CMS/HCC) - Primary     Secured unit for safety  Continue current meds  Monitor for changes  Mentation at baseline currently           Hypertension, essential     BP usually 130s systolic  Continue antihypertensives  AMIE diet  Monitor BP          Depression     Mood stable  Continue current meds        Medications, treatments, and labs reviewed  Continue medications and treatments as  listed in [facility]    Scribe Attestation  By signing my name below, I, Aisha Bishop   attest that this documentation has been prepared under the direction and in the presence of Velasquez Talley MD.    Provider Attestation - Scribe documentation  All medical record entries made by the Scribe were at my direction and personally dictated by me. I have reviewed the chart and agree that the record accurately reflects my personal performance of the history, physical exam, discussion and plan.  1. Severe vascular dementia with agitation (CMS/HCC)        2. Depression, unspecified depression type        3. Hypertension, essential              Electronically Signed By: Velasquez Talley MD   9/27/23  4:25 PM

## 2023-10-12 ENCOUNTER — NURSING HOME VISIT (OUTPATIENT)
Dept: POST ACUTE CARE | Facility: EXTERNAL LOCATION | Age: 88
End: 2023-10-12
Payer: COMMERCIAL

## 2023-10-12 DIAGNOSIS — F32.A DEPRESSION, UNSPECIFIED DEPRESSION TYPE: Primary | ICD-10-CM

## 2023-10-12 DIAGNOSIS — I10 HYPERTENSION, ESSENTIAL: ICD-10-CM

## 2023-10-12 DIAGNOSIS — F01.C11 SEVERE VASCULAR DEMENTIA WITH AGITATION (MULTI): ICD-10-CM

## 2023-10-12 PROCEDURE — 99308 SBSQ NF CARE LOW MDM 20: CPT | Performed by: NURSE PRACTITIONER

## 2023-10-12 NOTE — LETTER
Patient: Liseth Briggs  : 1920    Encounter Date: 10/12/2023    PROGRESS NOTE    Subjective  Chief complaint: Liseth Briggs is a 89 y.o. female who is a long term care hospice patient  being seen and evaluated for follow-up of depression.    HPI:  HPI   I was asked to see patient to evaluate continued use of Zoloft.  Patient with Dx of depression.  Mood has been stable. Symptoms controlled.  Patient denies feeling anxious or down.  Denies thoughts of harming self or others.     Objective  Vital signs: 16, 135/74, 96.8, 72, 96%  Physical Exam  Constitutional:       General: She is not in acute distress.  HENT:      Ears:      Comments: [place]  Eyes:      Comments: Blind   Cardiovascular:      Rate and Rhythm: Normal rate.   Pulmonary:      Effort: Pulmonary effort is normal.   Musculoskeletal:      Cervical back: Neck supple.   Neurological:      Mental Status: She is alert.   Psychiatric:         Mood and Affect: Mood normal.         Behavior: Behavior is cooperative.         Assessment/Plan  Problem List Items Addressed This Visit       Depression - Primary     Will decrease his Zoloft for gradual dose reduction and continue to monitor         Hypertension, essential     BP at goal  Continue antihypertensives  AMIE diet  Monitor BP          Severe vascular dementia with agitation (CMS/[facility])     Secured unit for safety          Medications, treatments, and labs reviewed  Continue medications and treatments as listed in EMR    Scribe Attestation  ILilibeth Scribe   attest that this documentation has been prepared under the direction and in the presence of TERRANCE Timmons-CNP    Provider Attestation - Scribe documentation  All medical record entries made by the Scribe were at my direction and personally dictated by me. I have reviewed the chart and agree that the record accurately reflects my personal performance of the history, physical exam, discussion and plan.   Sherley Abarca  APRN-CNP        Electronically Signed By: PETRA Timmons   10/21/23  7:55 PM

## 2023-10-13 ENCOUNTER — NURSING HOME VISIT (OUTPATIENT)
Dept: POST ACUTE CARE | Facility: EXTERNAL LOCATION | Age: 88
End: 2023-10-13
Payer: COMMERCIAL

## 2023-10-13 DIAGNOSIS — I10 HYPERTENSION, ESSENTIAL: ICD-10-CM

## 2023-10-13 DIAGNOSIS — F32.A DEPRESSION, UNSPECIFIED DEPRESSION TYPE: ICD-10-CM

## 2023-10-13 DIAGNOSIS — F01.C11 SEVERE VASCULAR DEMENTIA WITH AGITATION (MULTI): Primary | ICD-10-CM

## 2023-10-13 PROCEDURE — 99308 SBSQ NF CARE LOW MDM 20: CPT | Performed by: INTERNAL MEDICINE

## 2023-10-13 NOTE — LETTER
Patient: Liseth Briggs  : 1920    Encounter Date: 10/13/2023    PROGRESS NOTE    Subjective  Chief complaint: Liseth Briggs is a 89 y.o. female who is a long term care patient being seen and evaluated for depression    HPI:  Patient with dementia had a recent decrease of her Zoloft. Her mood has been stable and denies feeling down or thoughts of harming self or others. Mentation at baseline. Denies chest pain or headache.  No other concerns at this time. No acute distress.       Objective  Vital signs: 132/76, 96%    Physical Exam  Constitutional:       General: She is not in acute distress.  Eyes:      Extraocular Movements: Extraocular movements intact.   Cardiovascular:      Rate and Rhythm: Normal rate and regular rhythm.   Pulmonary:      Effort: Pulmonary effort is normal.      Breath sounds: Normal breath sounds.   Abdominal:      General: Bowel sounds are normal.      Palpations: Abdomen is soft.   Musculoskeletal:      Cervical back: Neck supple.      Right lower leg: No edema.      Left lower leg: No edema.   Neurological:      Mental Status: She is alert.   Psychiatric:         Mood and Affect: Mood normal.         Behavior: Behavior is cooperative.         Assessment/Plan  Problem List Items Addressed This Visit       Severe vascular dementia with agitation (CMS/HCC) - Primary     Secured unit for safety  Continue current meds  Monitor for changes  Mentation at baseline currently           Hypertension, essential     BP usually 130s systolic  Continue antihypertensives  AMIE diet  Monitor BP          Depression     Mood stable  Zoloft decreased  Monitor for changes          Medications, treatments, and labs reviewed  Continue medications and treatments as listed in PCC    Scribe Attestation  By signing my name below, Lelo FORD, Aisha   attest that this documentation has been prepared under the direction and in the presence of Velasquez Talley MD.    Provider Attestation - Scribe  documentation  All medical record entries made by the Scribe were at my direction and personally dictated by me. I have reviewed the chart and agree that the record accurately reflects my personal performance of the history, physical exam, discussion and plan.    1. Severe vascular dementia with agitation (CMS/HCC)        2. Hypertension, essential        3. Depression, unspecified depression type               Electronically Signed By: Velasquez Talley MD   10/13/23  2:28 PM

## 2023-10-13 NOTE — PROGRESS NOTES
PROGRESS NOTE    Subjective   Chief complaint: Liseth Briggs is a 89 y.o. female who is a long term care patient being seen and evaluated for depression    HPI:  Patient with dementia had a recent decrease of her Zoloft. Her mood has been stable and denies feeling down or thoughts of harming self or others. Mentation at baseline. Denies chest pain or headache.  No other concerns at this time. No acute distress.       Objective   Vital signs: 132/76, 96%    Physical Exam  Constitutional:       General: She is not in acute distress.  Eyes:      Extraocular Movements: Extraocular movements intact.   Cardiovascular:      Rate and Rhythm: Normal rate and regular rhythm.   Pulmonary:      Effort: Pulmonary effort is normal.      Breath sounds: Normal breath sounds.   Abdominal:      General: Bowel sounds are normal.      Palpations: Abdomen is soft.   Musculoskeletal:      Cervical back: Neck supple.      Right lower leg: No edema.      Left lower leg: No edema.   Neurological:      Mental Status: She is alert.   Psychiatric:         Mood and Affect: Mood normal.         Behavior: Behavior is cooperative.         Assessment/Plan   Problem List Items Addressed This Visit       Severe vascular dementia with agitation (CMS/[facility]) - Primary     Secured unit for safety  Continue current meds  Monitor for changes  Mentation at baseline currently           Hypertension, essential     BP usually 130s systolic  Continue antihypertensives  AMIE diet  Monitor BP          Depression     Mood stable  Zoloft decreased  Monitor for changes          Medications, treatments, and labs reviewed  Continue medications and treatments as listed in PCC    Scribe Attestation  By signing my name below, Lelo FORD Scribe   attest that this documentation has been prepared under the direction and in the presence of Velasquez Talley MD.    Provider Attestation - Scribe documentation  All medical record entries made by the Scribe were at my direction  and personally dictated by me. I have reviewed the chart and agree that the record accurately reflects my personal performance of the history, physical exam, discussion and plan.    1. Severe vascular dementia with agitation (CMS/HCC)        2. Hypertension, essential        3. Depression, unspecified depression type

## 2023-10-17 NOTE — PROGRESS NOTES
PROGRESS NOTE    Subjective   Chief complaint: Liseth Briggs is a 89 y.o. female who is a long term care hospice patient  being seen and evaluated for follow-up of depression.    HPI:  HPI   I was asked to see patient to evaluate continued use of Zoloft.  Patient with Dx of depression.  Mood has been stable. Symptoms controlled.  Patient denies feeling anxious or down.  Denies thoughts of harming self or others.     Objective   Vital signs: 16, 135/74, 96.8, 72, 96%  Physical Exam  Constitutional:       General: She is not in acute distress.  HENT:      Ears:      Comments: [place]  Eyes:      Comments: Blind   Cardiovascular:      Rate and Rhythm: Normal rate.   Pulmonary:      Effort: Pulmonary effort is normal.   Musculoskeletal:      Cervical back: Neck supple.   Neurological:      Mental Status: She is alert.   Psychiatric:         Mood and Affect: Mood normal.         Behavior: Behavior is cooperative.         Assessment/Plan   Problem List Items Addressed This Visit       Depression - Primary     Will decrease his Zoloft for gradual dose reduction and continue to monitor         Hypertension, essential     BP at goal  Continue antihypertensives  AMIE diet  Monitor BP          Severe vascular dementia with agitation (CMS/[facility])     Secured unit for safety          Medications, treatments, and labs reviewed  Continue medications and treatments as listed in EMR    Scribe Attestation  ILilibeth Scribe   attest that this documentation has been prepared under the direction and in the presence of PETRA Timmons    Provider Attestation - Scribe documentation  All medical record entries made by the Scribe were at my direction and personally dictated by me. I have reviewed the chart and agree that the record accurately reflects my personal performance of the history, physical exam, discussion and plan.   PETRA Timmons

## 2023-10-25 ENCOUNTER — NURSING HOME VISIT (OUTPATIENT)
Dept: POST ACUTE CARE | Facility: EXTERNAL LOCATION | Age: 88
End: 2023-10-25
Payer: COMMERCIAL

## 2023-10-25 DIAGNOSIS — F01.C11 SEVERE VASCULAR DEMENTIA WITH AGITATION (MULTI): Primary | ICD-10-CM

## 2023-10-25 DIAGNOSIS — F32.A DEPRESSION, UNSPECIFIED DEPRESSION TYPE: ICD-10-CM

## 2023-10-25 DIAGNOSIS — I10 HYPERTENSION, ESSENTIAL: ICD-10-CM

## 2023-10-25 PROCEDURE — 99309 SBSQ NF CARE MODERATE MDM 30: CPT | Performed by: INTERNAL MEDICINE

## 2023-10-25 NOTE — LETTER
Patient: Liseth Briggs  : 1920    Encounter Date: 10/25/2023    Subjective  Chief complaint: Liseth Briggs is a 89 y.o. female who is a long term resident patient being seen and evaluated for multiple medical problems.  Patient presents for chronic illness management    HPI:  Patient presents for general medical care and f/u.  Patient seen and examined at bedside.  No issues per nursing.  Patient has no acute complaints. Patient with dementia requires assistance for ADLs and mobility. patient with diagnosis of depression.  Denies feeling down and thoughts of harming self or others. Denies headache or chest pain. No acute distress.           Review of Systems  All systems reviewed and negative except for what was mentioned in the HPI    Vital signs: 126/74, 97%    Objective  Physical Exam  Constitutional:       General: She is not in acute distress.  Eyes:      Extraocular Movements: Extraocular movements intact.   Cardiovascular:      Rate and Rhythm: Normal rate and regular rhythm.   Pulmonary:      Effort: Pulmonary effort is normal.      Breath sounds: Normal breath sounds.   Abdominal:      General: Bowel sounds are normal.      Palpations: Abdomen is soft.   Musculoskeletal:      Cervical back: Neck supple.      Right lower leg: No edema.      Left lower leg: No edema.   Neurological:      Mental Status: She is alert.   Psychiatric:         Mood and Affect: Mood normal.         Behavior: Behavior is cooperative.         Assessment/Plan  Problem List Items Addressed This Visit       Severe vascular dementia with agitation (CMS/HCC) - Primary     Secured unit for safety         Hypertension, essential     BP at goal  Continue antihypertensives  AMIE diet  Monitor BP          Depression      Zoloft   continue to monitor        Medications, treatments, and labs reviewed  Continue medications and treatments as listed in [facility]    Scribe Attestation  By signing my name below, ILelo, Clementeibe   attest that  this documentation has been prepared under the direction and in the presence of Velasquez Talley MD.    Provider Attestation - Scribe documentation  All medical record entries made by the Scribe were at my direction and personally dictated by me. I have reviewed the chart and agree that the record accurately reflects my personal performance of the history, physical exam, discussion and plan.  1. Severe vascular dementia with agitation (CMS/HCC)        2. Hypertension, essential        3. Depression, unspecified depression type              Electronically Signed By: Velasquez Talley MD   10/25/23  6:22 PM

## 2023-10-25 NOTE — PROGRESS NOTES
Subjective   Chief complaint: Liseth Briggs is a 89 y.o. female who is a long term resident patient being seen and evaluated for multiple medical problems.  Patient presents for chronic illness management    HPI:  Patient presents for general medical care and f/u.  Patient seen and examined at bedside.  No issues per nursing.  Patient has no acute complaints. Patient with dementia requires assistance for ADLs and mobility. patient with diagnosis of depression.  Denies feeling down and thoughts of harming self or others. Denies headache or chest pain. No acute distress.           Review of Systems  All systems reviewed and negative except for what was mentioned in the HPI    Vital signs: 126/74, 97%    Objective   Physical Exam  Constitutional:       General: She is not in acute distress.  Eyes:      Extraocular Movements: Extraocular movements intact.   Cardiovascular:      Rate and Rhythm: Normal rate and regular rhythm.   Pulmonary:      Effort: Pulmonary effort is normal.      Breath sounds: Normal breath sounds.   Abdominal:      General: Bowel sounds are normal.      Palpations: Abdomen is soft.   Musculoskeletal:      Cervical back: Neck supple.      Right lower leg: No edema.      Left lower leg: No edema.   Neurological:      Mental Status: She is alert.   Psychiatric:         Mood and Affect: Mood normal.         Behavior: Behavior is cooperative.         Assessment/Plan   Problem List Items Addressed This Visit       Severe vascular dementia with agitation (CMS/HCC) - Primary     Secured unit for safety         Hypertension, essential     BP at goal  Continue antihypertensives  AMIE diet  Monitor BP          Depression      Zoloft   continue to monitor        Medications, treatments, and labs reviewed  Continue medications and treatments as listed in PCC    Scribe Attestation  By signing my name below, ILelo, Scribe   attest that this documentation has been prepared under the direction and in the  presence of Velasquez Talley MD.    Provider Attestation - Scribe documentation  All medical record entries made by the Scribe were at my direction and personally dictated by me. I have reviewed the chart and agree that the record accurately reflects my personal performance of the history, physical exam, discussion and plan.  1. Severe vascular dementia with agitation (CMS/HCC)        2. Hypertension, essential        3. Depression, unspecified depression type

## 2023-11-24 ENCOUNTER — NURSING HOME VISIT (OUTPATIENT)
Dept: POST ACUTE CARE | Facility: EXTERNAL LOCATION | Age: 88
End: 2023-11-24
Payer: COMMERCIAL

## 2023-11-24 DIAGNOSIS — I10 HYPERTENSION, ESSENTIAL: ICD-10-CM

## 2023-11-24 DIAGNOSIS — F32.A DEPRESSION, UNSPECIFIED DEPRESSION TYPE: ICD-10-CM

## 2023-11-24 DIAGNOSIS — F01.C11 SEVERE VASCULAR DEMENTIA WITH AGITATION (MULTI): Primary | ICD-10-CM

## 2023-11-24 PROCEDURE — 99309 SBSQ NF CARE MODERATE MDM 30: CPT | Performed by: INTERNAL MEDICINE

## 2023-11-24 NOTE — LETTER
Patient: Liseth Briggs  : 1920    Encounter Date: 2023    Subjective  Chief complaint: Liseth Briggs is a 89 y.o. female who is a long term resident patient being seen and evaluated for multiple medical problems.  Patient presents for chronic illness management    HPI:  Patient presents for general medical care and f/u.  Patient seen and examined at bedside.  No issues per nursing.  Patient has no acute complaints. Patient with dementia requires assistance for ADLs and mobility. patient with diagnosis of depression.  Denies feeling down and thoughts of harming self or others. Denies headache or chest pain. No acute distress.           Review of Systems  All systems reviewed and negative except for what was mentioned in the HPI    Vital signs: 126/74, 97%    Objective  Physical Exam  Constitutional:       General: She is not in acute distress.  Eyes:      Extraocular Movements: Extraocular movements intact.   Cardiovascular:      Rate and Rhythm: Normal rate and regular rhythm.   Pulmonary:      Effort: Pulmonary effort is normal.      Breath sounds: Normal breath sounds.   Abdominal:      General: Bowel sounds are normal.      Palpations: Abdomen is soft.   Musculoskeletal:      Cervical back: Neck supple.      Right lower leg: No edema.      Left lower leg: No edema.   Neurological:      Mental Status: She is alert.   Psychiatric:         Mood and Affect: Mood normal.         Behavior: Behavior is cooperative.         Assessment/Plan  Problem List Items Addressed This Visit       Severe vascular dementia with agitation (CMS/HCC) - Primary     Secured unit for safety         Hypertension, essential     BP at goal  Continue antihypertensives  AMIE diet  Monitor BP          Depression      Zoloft   continue to monitor        Medications, treatments, and labs reviewed  Continue medications and treatments as listed in [facility]    Scribe Attestation  By signing my name below, ILelo, Clementeibe   attest that  this documentation has been prepared under the direction and in the presence of Velasquez Talley MD.    Provider Attestation - Scribe documentation  All medical record entries made by the Scribe were at my direction and personally dictated by me. I have reviewed the chart and agree that the record accurately reflects my personal performance of the history, physical exam, discussion and plan.  1. Severe vascular dementia with agitation (CMS/HCC)        2. Hypertension, essential        3. Depression, unspecified depression type            Electronically Signed By: Velasquez Tallye MD   11/26/23  9:43 AM

## 2023-12-27 ENCOUNTER — NURSING HOME VISIT (OUTPATIENT)
Dept: POST ACUTE CARE | Facility: EXTERNAL LOCATION | Age: 88
End: 2023-12-27
Payer: COMMERCIAL

## 2023-12-27 DIAGNOSIS — F32.A DEPRESSION, UNSPECIFIED DEPRESSION TYPE: ICD-10-CM

## 2023-12-27 DIAGNOSIS — F01.C11 SEVERE VASCULAR DEMENTIA WITH AGITATION (MULTI): Primary | ICD-10-CM

## 2023-12-27 DIAGNOSIS — R62.7 FAILURE TO THRIVE IN ADULT: ICD-10-CM

## 2023-12-27 DIAGNOSIS — I10 HYPERTENSION, ESSENTIAL: ICD-10-CM

## 2023-12-27 PROCEDURE — 99309 SBSQ NF CARE MODERATE MDM 30: CPT | Performed by: INTERNAL MEDICINE

## 2023-12-27 NOTE — LETTER
Patient: Liseth Briggs  : 1920    Encounter Date: 2023    PROGRESS NOTE    Subjective  Chief complaint: Liseth Briggs is a 89 y.o. female who is a long term care patient being seen and evaluated for monthly general medical care and follow-up    HPI:  HPI  Patient presents for general medical care and f/u.  Patient seen and examined at bedside.  No issues per nursing.  Patient has no acute complaints.  Patient is on hospice.  Patient has dementia and requires assist with ADLs.  HTN BP at goal.  Denies chest pain and headache.  Patient with diagnosis of depression.  Mood is stable.  Denies feeling down and thoughts of harming self or others.  Patient no acute distress.    Objective  Vital signs: 123/75, 97.2, 89, 18, 98%    Physical Exam  Constitutional:       General: She is not in acute distress.  Eyes:      Extraocular Movements: Extraocular movements intact.   Cardiovascular:      Rate and Rhythm: Normal rate and regular rhythm.   Pulmonary:      Effort: Pulmonary effort is normal.      Breath sounds: Normal breath sounds.   Abdominal:      General: Bowel sounds are normal.      Palpations: Abdomen is soft.   Musculoskeletal:      Cervical back: Neck supple.      Right lower leg: No edema.      Left lower leg: No edema.   Neurological:      Mental Status: She is alert.   Psychiatric:         Mood and Affect: Mood normal.         Behavior: Behavior is cooperative.         Assessment/Plan  Problem List Items Addressed This Visit       Severe vascular dementia with agitation (CMS/HCC) - Primary     Secured unit for safety         Hypertension, essential     BP at goal  Continue antihypertensives  AMIE diet  Monitor BP          Depression      Zoloft   continue to monitor         Failure to thrive in adult     Hospice for comfort measures          Medications, treatments, and labs reviewed  Continue medications and treatments as listed in EMR    Scribe Attestation  Tonya FORD Scribe   attest that  this documentation has been prepared under the direction and in the presence of Velasquez Talley MD    Provider Attestation - Scribe documentation  All medical record entries made by the Scribe were at my direction and personally dictated by me. I have reviewed the chart and agree that the record accurately reflects my personal performance of the history, physical exam, discussion and plan.   Velasquez Talley MD            Electronically Signed By: Velasquez Talley MD   12/27/23  4:31 PM

## 2023-12-27 NOTE — PROGRESS NOTES
PROGRESS NOTE    Subjective   Chief complaint: Liseth Briggs is a 89 y.o. female who is a long term care patient being seen and evaluated for monthly general medical care and follow-up    HPI:  HPI  Patient presents for general medical care and f/u.  Patient seen and examined at bedside.  No issues per nursing.  Patient has no acute complaints.  Patient is on hospice.  Patient has dementia and requires assist with ADLs.  HTN BP at goal.  Denies chest pain and headache.  Patient with diagnosis of depression.  Mood is stable.  Denies feeling down and thoughts of harming self or others.  Patient no acute distress.    Objective   Vital signs: 123/75, 97.2, 89, 18, 98%    Physical Exam  Constitutional:       General: She is not in acute distress.  Eyes:      Extraocular Movements: Extraocular movements intact.   Cardiovascular:      Rate and Rhythm: Normal rate and regular rhythm.   Pulmonary:      Effort: Pulmonary effort is normal.      Breath sounds: Normal breath sounds.   Abdominal:      General: Bowel sounds are normal.      Palpations: Abdomen is soft.   Musculoskeletal:      Cervical back: Neck supple.      Right lower leg: No edema.      Left lower leg: No edema.   Neurological:      Mental Status: She is alert.   Psychiatric:         Mood and Affect: Mood normal.         Behavior: Behavior is cooperative.         Assessment/Plan   Problem List Items Addressed This Visit       Severe vascular dementia with agitation (CMS/[facility]) - Primary     Secured unit for safety         Hypertension, essential     BP at goal  Continue antihypertensives  AMIE diet  Monitor BP          Depression      Zoloft   continue to monitor         Failure to thrive in adult     Hospice for comfort measures          Medications, treatments, and labs reviewed  Continue medications and treatments as listed in EMR    Clementeibe Attestation  LILIANA, Aisha Eastman   attest that this documentation has been prepared under the direction and in the  presence of Velasquez Talley MD    Provider Attestation - Scribe documentation  All medical record entries made by the Scribe were at my direction and personally dictated by me. I have reviewed the chart and agree that the record accurately reflects my personal performance of the history, physical exam, discussion and plan.   Velasquez Talley MD

## 2023-12-29 ENCOUNTER — NURSING HOME VISIT (OUTPATIENT)
Dept: POST ACUTE CARE | Facility: EXTERNAL LOCATION | Age: 88
End: 2023-12-29
Payer: COMMERCIAL

## 2023-12-29 DIAGNOSIS — I10 HYPERTENSION, ESSENTIAL: ICD-10-CM

## 2023-12-29 DIAGNOSIS — R21 RASH: Primary | ICD-10-CM

## 2023-12-29 DIAGNOSIS — R62.7 FAILURE TO THRIVE IN ADULT: ICD-10-CM

## 2023-12-29 PROCEDURE — 99308 SBSQ NF CARE LOW MDM 20: CPT | Performed by: INTERNAL MEDICINE

## 2023-12-29 NOTE — LETTER
Patient: Liseth Briggs  : 1920    Encounter Date: 2023    PROGRESS NOTE    Subjective  Chief complaint: Liseth Briggs is a 89 y.o. female who is a long term care patient being seen and evaluated for rash.    HPI:  HPI  Patient is on hospice.  Nursing called and reported patient had a right upper thigh rash, hospice ordered [facility].  Patient's rash is improving.  Patient is comfortable, lying in bed.  Patient seen and examined at bedside, in no apparent distress.  Denies chest pain or shortness of breath.  Denies nausea or vomiting.    Objective  Vital signs: 123/75, 97.2, 89, 18, 98%    Physical Exam  Constitutional:       General: She is not in acute distress.  Eyes:      Extraocular Movements: Extraocular movements intact.   Cardiovascular:      Rate and Rhythm: Normal rate and regular rhythm.   Pulmonary:      Effort: Pulmonary effort is normal.      Breath sounds: Normal breath sounds.   Abdominal:      General: Bowel sounds are normal.      Palpations: Abdomen is soft.   Musculoskeletal:      Cervical back: Neck supple.      Right lower leg: No edema.      Left lower leg: No edema.   Skin:     Comments: Rash to right thigh   Neurological:      Mental Status: She is alert.   Psychiatric:         Mood and Affect: Mood normal.         Behavior: Behavior is cooperative.         Assessment/Plan  Problem List Items Addressed This Visit       Hypertension, essential     BP at goal  Continue antihypertensives  AMIE diet  Monitor BP          Failure to thrive in adult     Hospice for comfort measures         Rash - Primary     To right upper thigh, continue [facility]ad          Medications, treatments, and labs reviewed  Continue medications and treatments as listed in EMR    Scribe Attestation  I, Aisha Eastman   attest that this documentation has been prepared under the direction and in the presence of Velasquez Talley MD    Provider Attestation - Scribe documentation  All medical record entries made by the  Scribe were at my direction and personally dictated by me. I have reviewed the chart and agree that the record accurately reflects my personal performance of the history, physical exam, discussion and plan.   Velasquez Talley MD            Electronically Signed By: Velasquez Talley MD   12/30/23  5:15 PM

## 2023-12-29 NOTE — PROGRESS NOTES
PROGRESS NOTE    Subjective   Chief complaint: Liseth Briggs is a 89 y.o. female who is a long term care patient being seen and evaluated for rash.    HPI:  HPI  Patient is on hospice.  Nursing called and reported patient had a right upper thigh rash, hospice ordered Triad.  Patient's rash is improving.  Patient is comfortable, lying in bed.  Patient seen and examined at bedside, in no apparent distress.  Denies chest pain or shortness of breath.  Denies nausea or vomiting.    Objective   Vital signs: 123/75, 97.2, 89, 18, 98%    Physical Exam  Constitutional:       General: She is not in acute distress.  Eyes:      Extraocular Movements: Extraocular movements intact.   Cardiovascular:      Rate and Rhythm: Normal rate and regular rhythm.   Pulmonary:      Effort: Pulmonary effort is normal.      Breath sounds: Normal breath sounds.   Abdominal:      General: Bowel sounds are normal.      Palpations: Abdomen is soft.   Musculoskeletal:      Cervical back: Neck supple.      Right lower leg: No edema.      Left lower leg: No edema.   Skin:     Comments: Rash to right thigh   Neurological:      Mental Status: She is alert.   Psychiatric:         Mood and Affect: Mood normal.         Behavior: Behavior is cooperative.         Assessment/Plan   Problem List Items Addressed This Visit       Hypertension, essential     BP at goal  Continue antihypertensives  AMIE diet  Monitor BP          Failure to thrive in adult     Hospice for comfort measures         Rash - Primary     To right upper thigh, continue Triad          Medications, treatments, and labs reviewed  Continue medications and treatments as listed in EMR    Scribe Attestation  I, Aisha Eastman   attest that this documentation has been prepared under the direction and in the presence of Velasquez Talley MD    Provider Attestation - Scribe documentation  All medical record entries made by the Scribe were at my direction and personally dictated by me. I have  reviewed the chart and agree that the record accurately reflects my personal performance of the history, physical exam, discussion and plan.   Velasquez Talley MD

## 2024-01-08 ENCOUNTER — NURSING HOME VISIT (OUTPATIENT)
Dept: POST ACUTE CARE | Facility: EXTERNAL LOCATION | Age: 89
End: 2024-01-08
Payer: COMMERCIAL

## 2024-01-08 DIAGNOSIS — R06.02 SOB (SHORTNESS OF BREATH): Primary | ICD-10-CM

## 2024-01-08 DIAGNOSIS — R62.7 FAILURE TO THRIVE IN ADULT: ICD-10-CM

## 2024-01-08 DIAGNOSIS — F01.C11 SEVERE VASCULAR DEMENTIA WITH AGITATION (MULTI): ICD-10-CM

## 2024-01-08 PROCEDURE — 99308 SBSQ NF CARE LOW MDM 20: CPT | Performed by: INTERNAL MEDICINE

## 2024-01-08 NOTE — LETTER
Patient: Liseth Briggs  : 1920    Encounter Date: 2024    PROGRESS NOTE    Subjective  Chief complaint: Liseth Briggs is a 89 y.o. female who is a long term care patient being seen and evaluated for shortness of breath    HPI:  HPI  Patient is seen due to nurse reporting patient with labored breathing, weak, low BP at night.  Hospice was in and ordered Roxanol and Ativan as needed.  Patient seen and examined at bedside, in no apparent distress.  Patient appears sleepy.    Objective  Vital signs: 118/68, 98.0, 79, 16, 95%    Physical Exam  Constitutional:       General: She is not in acute distress.     Comments: Sleepy   Eyes:      Extraocular Movements: Extraocular movements intact.   Cardiovascular:      Rate and Rhythm: Normal rate and regular rhythm.   Pulmonary:      Effort: Pulmonary effort is normal.      Breath sounds: Normal breath sounds.   Musculoskeletal:      Cervical back: Neck supple.      Right lower leg: No edema.      Left lower leg: No edema.   Neurological:      Mental Status: She is alert.      Motor: Weakness present.   Psychiatric:         Mood and Affect: Mood normal.         Behavior: Behavior is cooperative.         Assessment/Plan  Problem List Items Addressed This Visit       Severe vascular dementia with agitation (CMS/[facility])     Secured unit for safety  Assist with care         Failure to thrive in adult     Hospice for comfort measures with comfort meds in place         SOB (shortness of breath) - Primary     Started comfort meds  Continue hospice care          Medications, treatments, and labs reviewed  Continue medications and treatments as listed in EMR    Scribe Attestation  Tonya FORD Scribe   attest that this documentation has been prepared under the direction and in the presence of Velasquez Talley MD    Provider Attestation - Scribe documentation  All medical record entries made by the Scribe were at my direction and personally dictated by me. I have reviewed  the chart and agree that the record accurately reflects my personal performance of the history, physical exam, discussion and plan.   Velasquez Talley MD            Electronically Signed By: Velasquez Talley MD   1/17/24  3:02 PM

## 2024-01-09 ENCOUNTER — NURSING HOME VISIT (OUTPATIENT)
Dept: POST ACUTE CARE | Facility: EXTERNAL LOCATION | Age: 89
End: 2024-01-09
Payer: COMMERCIAL

## 2024-01-09 DIAGNOSIS — I10 HYPERTENSION, ESSENTIAL: ICD-10-CM

## 2024-01-09 DIAGNOSIS — L08.9 INFECTED LESION OF SKIN: Primary | ICD-10-CM

## 2024-01-09 DIAGNOSIS — R62.7 FAILURE TO THRIVE IN ADULT: ICD-10-CM

## 2024-01-09 DIAGNOSIS — F01.C11 SEVERE VASCULAR DEMENTIA WITH AGITATION (MULTI): ICD-10-CM

## 2024-01-09 PROCEDURE — 99308 SBSQ NF CARE LOW MDM 20: CPT | Performed by: NURSE PRACTITIONER

## 2024-01-09 NOTE — LETTER
Patient: Liseth Briggs  : 1920    Encounter Date: 2024    PROGRESS NOTE    Subjective  Chief complaint: Liseth Briggs is a 89 y.o. female who is a long term care patient being seen and evaluated for infected lesion.    HPI:  HPI  Patient is on hospice.  Patient is seen due to nurse reporting patient with lesion to left face with foul odor.  Patient was seen and examined at bedside, in no apparent distress.  Denies wound pain shortness of breath nausea vomiting fever chills.    Objective  Vital signs: 118/68, 98.0, 79, 16, 95%    Physical Exam  Constitutional:       General: She is not in acute distress.  Eyes:      Extraocular Movements: Extraocular movements intact.   Cardiovascular:      Rate and Rhythm: Normal rate.   Pulmonary:      Effort: Pulmonary effort is normal.   Musculoskeletal:      Cervical back: Neck supple.      Right lower leg: No edema.      Left lower leg: No edema.   Skin:     Comments: Left face lesion is raised, brown, irregular shaped with foul odor.  No drainage.   Neurological:      Mental Status: She is alert.   Psychiatric:         Mood and Affect: Mood normal.         Behavior: Behavior is cooperative.         Assessment/Plan  Problem List Items Addressed This Visit       Severe vascular dementia with agitation (CMS/[facility])     Secured unit for safety  Assist with care         Hypertension, essential     Continue antihypertensives  AMIE diet  Monitor BP          Failure to thrive in adult     Hospice for comfort measures with comfort meds in place         Infected lesion of skin - Primary     Start doxycycline          Medications, treatments, and labs reviewed  Continue medications and treatments as listed in EMR    Scribe Attestation  I, Aisha Eastman   attest that this documentation has been prepared under the direction and in the presence of PETRA Timmons    Provider Attestation - Scribe documentation  All medical record entries made by the Scribe were  at my direction and personally dictated by me. I have reviewed the chart and agree that the record accurately reflects my personal performance of the history, physical exam, discussion and plan.   PETRA Timmons            Electronically Signed By: PETRA Timmons   1/19/24  9:43 AM

## 2024-01-12 ENCOUNTER — NURSING HOME VISIT (OUTPATIENT)
Dept: POST ACUTE CARE | Facility: EXTERNAL LOCATION | Age: 89
End: 2024-01-12
Payer: COMMERCIAL

## 2024-01-12 DIAGNOSIS — I10 HYPERTENSION, ESSENTIAL: ICD-10-CM

## 2024-01-12 DIAGNOSIS — L08.9 INFECTED LESION OF SKIN: ICD-10-CM

## 2024-01-12 DIAGNOSIS — R62.7 FAILURE TO THRIVE IN ADULT: Primary | ICD-10-CM

## 2024-01-12 DIAGNOSIS — F01.C11 SEVERE VASCULAR DEMENTIA WITH AGITATION (MULTI): ICD-10-CM

## 2024-01-12 PROCEDURE — 99308 SBSQ NF CARE LOW MDM 20: CPT | Performed by: INTERNAL MEDICINE

## 2024-01-12 NOTE — PROGRESS NOTES
PROGRESS NOTE    Subjective   Chief complaint: Liseth Briggs is a 89 y.o. female who is a long term care patient being seen and evaluated for infected lesion.    HPI:  HPI  Patient is on hospice.  Patient does have comfort meds in place.  Patient is also seen in follow-up for infected lesion to left forehead, was started on antibiotics.  Tolerating without adverse effects.  Patient was seen and examined at bedside, in no apparent distress.  Nursing reporting no other new concerns at this time.    Objective   Vital signs: 118/68, 98.0, 79, 16, 95%    Physical Exam  Constitutional:       General: She is not in acute distress.  HENT:      Head:      Comments: [place]  Eyes:      Extraocular Movements: Extraocular movements intact.      Comments: Blined   Cardiovascular:      Rate and Rhythm: Normal rate and regular rhythm.   Pulmonary:      Effort: Pulmonary effort is normal.      Breath sounds: Normal breath sounds.   Abdominal:      General: Bowel sounds are normal.      Palpations: Abdomen is soft.   Musculoskeletal:      Cervical back: Neck supple.      Right lower leg: No edema.      Left lower leg: No edema.   Skin:     Comments: Left forehead skin lesion with less redness   Neurological:      Mental Status: She is alert.   Psychiatric:         Mood and Affect: Mood normal.         Behavior: Behavior is cooperative.         Assessment/Plan   Problem List Items Addressed This Visit       Severe vascular dementia with agitation (CMS/[facility])     Secured unit for safety  Assist with care         Hypertension, essential     BP at goal  Continue antihypertensives  AMIE diet  Monitor BP          Failure to thrive in adult - Primary     Hospice for comfort measures with comfort meds in place         Infected lesion of skin     Continue antibiotic until complete, 1/17/2024          Medications, treatments, and labs reviewed  Continue medications and treatments as listed in EMR    Aisha Hutchinson I, Aisha Eastman    attest that this documentation has been prepared under the direction and in the presence of Velasquez Talley MD    Provider Attestation - Scribe documentation  All medical record entries made by the Scribe were at my direction and personally dictated by me. I have reviewed the chart and agree that the record accurately reflects my personal performance of the history, physical exam, discussion and plan.   Velasquez Talley MD

## 2024-01-12 NOTE — LETTER
Patient: Liseth Briggs  : 1920    Encounter Date: 2024    PROGRESS NOTE    Subjective  Chief complaint: Liseth Briggs is a 89 y.o. female who is a long term care patient being seen and evaluated for infected lesion.    HPI:  HPI  Patient is on hospice.  Patient does have comfort meds in place.  Patient is also seen in follow-up for infected lesion to left forehead, was started on antibiotics.  Tolerating without adverse effects.  Patient was seen and examined at bedside, in no apparent distress.  Nursing reporting no other new concerns at this time.    Objective  Vital signs: 118/68, 98.0, 79, 16, 95%    Physical Exam  Constitutional:       General: She is not in acute distress.  HENT:      Head:      Comments: [place]  Eyes:      Extraocular Movements: Extraocular movements intact.      Comments: Blined   Cardiovascular:      Rate and Rhythm: Normal rate and regular rhythm.   Pulmonary:      Effort: Pulmonary effort is normal.      Breath sounds: Normal breath sounds.   Abdominal:      General: Bowel sounds are normal.      Palpations: Abdomen is soft.   Musculoskeletal:      Cervical back: Neck supple.      Right lower leg: No edema.      Left lower leg: No edema.   Skin:     Comments: Left forehead skin lesion with less redness   Neurological:      Mental Status: She is alert.   Psychiatric:         Mood and Affect: Mood normal.         Behavior: Behavior is cooperative.         Assessment/Plan  Problem List Items Addressed This Visit       Severe vascular dementia with agitation (CMS/[facility])     Secured unit for safety  Assist with care         Hypertension, essential     BP at goal  Continue antihypertensives  AMIE diet  Monitor BP          Failure to thrive in adult - Primary     Hospice for comfort measures with comfort meds in place         Infected lesion of skin     Continue antibiotic until complete, 2024          Medications, treatments, and labs reviewed  Continue medications and treatments as  listed in EMR    Scribe Attestation  I, Aisha Eastman   attest that this documentation has been prepared under the direction and in the presence of Velasquez Talley MD    Provider Attestation - Scribe documentation  All medical record entries made by the Scribe were at my direction and personally dictated by me. I have reviewed the chart and agree that the record accurately reflects my personal performance of the history, physical exam, discussion and plan.   Velasquez Talley MD            Electronically Signed By: Velasquez Talley MD   1/13/24  7:36 AM

## 2024-01-15 PROBLEM — R06.02 SOB (SHORTNESS OF BREATH): Status: ACTIVE | Noted: 2024-01-15

## 2024-01-15 NOTE — PROGRESS NOTES
PROGRESS NOTE    Subjective   Chief complaint: Liseth Briggs is a 89 y.o. female who is a long term care patient being seen and evaluated for shortness of breath    HPI:  HPI  Patient is seen due to nurse reporting patient with labored breathing, weak, low BP at night.  Hospice was in and ordered Roxanol and Ativan as needed.  Patient seen and examined at bedside, in no apparent distress.  Patient appears sleepy.    Objective   Vital signs: 118/68, 98.0, 79, 16, 95%    Physical Exam  Constitutional:       General: She is not in acute distress.     Comments: Sleepy   Eyes:      Extraocular Movements: Extraocular movements intact.   Cardiovascular:      Rate and Rhythm: Normal rate and regular rhythm.   Pulmonary:      Effort: Pulmonary effort is normal.      Breath sounds: Normal breath sounds.   Musculoskeletal:      Cervical back: Neck supple.      Right lower leg: No edema.      Left lower leg: No edema.   Neurological:      Mental Status: She is alert.      Motor: Weakness present.   Psychiatric:         Mood and Affect: Mood normal.         Behavior: Behavior is cooperative.         Assessment/Plan   Problem List Items Addressed This Visit       Severe vascular dementia with agitation (CMS/[facility])     Secured unit for safety  Assist with care         Failure to thrive in adult     Hospice for comfort measures with comfort meds in place         SOB (shortness of breath) - Primary     Started comfort meds  Continue hospice care          Medications, treatments, and labs reviewed  Continue medications and treatments as listed in EMR    Scribe Attestation  ITonya Scribe   attest that this documentation has been prepared under the direction and in the presence of Velasquez Talley MD    Provider Attestation - Scribe documentation  All medical record entries made by the Scribe were at my direction and personally dictated by me. I have reviewed the chart and agree that the record accurately reflects my personal  performance of the history, physical exam, discussion and plan.   Velasquez Talley MD

## 2024-01-16 NOTE — PROGRESS NOTES
PROGRESS NOTE    Subjective   Chief complaint: Liseth Briggs is a 89 y.o. female who is a long term care patient being seen and evaluated for infected lesion.    HPI:  HPI  Patient is on hospice.  Patient is seen due to nurse reporting patient with lesion to left face with foul odor.  Patient was seen and examined at bedside, in no apparent distress.  Denies wound pain shortness of breath nausea vomiting fever chills.    Objective   Vital signs: 118/68, 98.0, 79, 16, 95%    Physical Exam  Constitutional:       General: She is not in acute distress.  Eyes:      Extraocular Movements: Extraocular movements intact.   Cardiovascular:      Rate and Rhythm: Normal rate.   Pulmonary:      Effort: Pulmonary effort is normal.   Musculoskeletal:      Cervical back: Neck supple.      Right lower leg: No edema.      Left lower leg: No edema.   Skin:     Comments: Left face lesion is raised, brown, irregular shaped with foul odor.  No drainage.   Neurological:      Mental Status: She is alert.   Psychiatric:         Mood and Affect: Mood normal.         Behavior: Behavior is cooperative.         Assessment/Plan   Problem List Items Addressed This Visit       Severe vascular dementia with agitation (CMS/[facility])     Secured unit for safety  Assist with care         Hypertension, essential     Continue antihypertensives  AMIE diet  Monitor BP          Failure to thrive in adult     Hospice for comfort measures with comfort meds in place         Infected lesion of skin - Primary     Start doxycycline          Medications, treatments, and labs reviewed  Continue medications and treatments as listed in EMR    Scribe Attestation  ITonya Scribe   attest that this documentation has been prepared under the direction and in the presence of TERRANCE Timmons-CNP    Provider Attestation - Scribe documentation  All medical record entries made by the Scribe were at my direction and personally dictated by me. I have reviewed the  chart and agree that the record accurately reflects my personal performance of the history, physical exam, discussion and plan.   Sherley Abarca, APRN-CNP

## 2024-01-24 ENCOUNTER — NURSING HOME VISIT (OUTPATIENT)
Dept: POST ACUTE CARE | Facility: EXTERNAL LOCATION | Age: 89
End: 2024-01-24
Payer: COMMERCIAL

## 2024-01-24 DIAGNOSIS — F01.C11 SEVERE VASCULAR DEMENTIA WITH AGITATION (MULTI): ICD-10-CM

## 2024-01-24 DIAGNOSIS — I10 HYPERTENSION, ESSENTIAL: ICD-10-CM

## 2024-01-24 DIAGNOSIS — R62.7 FAILURE TO THRIVE IN ADULT: Primary | ICD-10-CM

## 2024-01-24 DIAGNOSIS — F32.A DEPRESSION, UNSPECIFIED DEPRESSION TYPE: ICD-10-CM

## 2024-01-24 PROCEDURE — 99309 SBSQ NF CARE MODERATE MDM 30: CPT | Performed by: INTERNAL MEDICINE

## 2024-01-24 NOTE — LETTER
Patient: Liseth Briggs  : 1920    Encounter Date: 2024    PROGRESS NOTE    Subjective  Chief complaint: Liseth Briggs is a 89 y.o. female who is a long term care patient being seen and evaluated for monthly general medical care and follow-up    HPI:  HPI  Patient presents for general medical care and f/u.  Patient seen and examined at bedside.  No issues per nursing.  Patient has no acute complaints.  Patient is on hospice with comfort meds in place.  Patient appears comfortable.  Patient has dementia and requires assist with ADLs, confused at baseline.  HTN BP at goal.  Denies chest pain and headache.  Patient with diagnosis of depression.  Mood is stable.  Denies feeling down and thoughts of harming self or others.  No acute distress.    Objective  Vital signs: 118/68, 98.0, 79, 16, 95%    Physical Exam  Constitutional:       General: She is not in acute distress.  Eyes:      Extraocular Movements: Extraocular movements intact.   Pulmonary:      Effort: Pulmonary effort is normal.   Musculoskeletal:      Cervical back: Neck supple.   Neurological:      Mental Status: She is alert.   Psychiatric:         Mood and Affect: Mood normal.         Behavior: Behavior is cooperative.         Assessment/Plan  Problem List Items Addressed This Visit       Severe vascular dementia with agitation (CMS/[facility])     Secured unit for safety  Assist with care         Hypertension, essential     Continue antihypertensives  AMIE diet  Monitor BP          Depression      Zoloft   continue to monitor         Failure to thrive in adult - Primary     Hospice for comfort measures with comfort meds in place          Medications, treatments, and labs reviewed  Continue medications and treatments as listed in EMR    Scribe Attestation  I, Aisha Eastman   attest that this documentation has been prepared under the direction and in the presence of Velasquez Talley MD    Provider Attestation - Scribe documentation  All medical  record entries made by the Scribe were at my direction and personally dictated by me. I have reviewed the chart and agree that the record accurately reflects my personal performance of the history, physical exam, discussion and plan.   Velasquez Talley MD            Electronically Signed By: Velasquez Talley MD   1/24/24  1:26 PM

## 2024-01-24 NOTE — PROGRESS NOTES
PROGRESS NOTE    Subjective   Chief complaint: Liseth Briggs is a 89 y.o. female who is a long term care patient being seen and evaluated for monthly general medical care and follow-up    HPI:  HPI  Patient presents for general medical care and f/u.  Patient seen and examined at bedside.  No issues per nursing.  Patient has no acute complaints.  Patient is on hospice with comfort meds in place.  Patient appears comfortable.  Patient has dementia and requires assist with ADLs, confused at baseline.  HTN BP at goal.  Denies chest pain and headache.  Patient with diagnosis of depression.  Mood is stable.  Denies feeling down and thoughts of harming self or others.  No acute distress.    Objective   Vital signs: 118/68, 98.0, 79, 16, 95%    Physical Exam  Constitutional:       General: She is not in acute distress.  Eyes:      Extraocular Movements: Extraocular movements intact.   Pulmonary:      Effort: Pulmonary effort is normal.   Musculoskeletal:      Cervical back: Neck supple.   Neurological:      Mental Status: She is alert.   Psychiatric:         Mood and Affect: Mood normal.         Behavior: Behavior is cooperative.         Assessment/Plan   Problem List Items Addressed This Visit       Severe vascular dementia with agitation (CMS/[facility])     Secured unit for safety  Assist with care         Hypertension, essential     Continue antihypertensives  AMIE diet  Monitor BP          Depression      Zoloft   continue to monitor         Failure to thrive in adult - Primary     Hospice for comfort measures with comfort meds in place          Medications, treatments, and labs reviewed  Continue medications and treatments as listed in EMR    Scribe Attestation  I, Aisha Eastman   attest that this documentation has been prepared under the direction and in the presence of Velasquez Talley MD    Provider Attestation - Scribe documentation  All medical record entries made by the Scribe were at my direction and personally  dictated by me. I have reviewed the chart and agree that the record accurately reflects my personal performance of the history, physical exam, discussion and plan.   Velasquez Talley MD

## 2024-01-26 ENCOUNTER — NURSING HOME VISIT (OUTPATIENT)
Dept: POST ACUTE CARE | Facility: EXTERNAL LOCATION | Age: 89
End: 2024-01-26
Payer: COMMERCIAL

## 2024-01-26 DIAGNOSIS — F01.C11 SEVERE VASCULAR DEMENTIA WITH AGITATION (MULTI): ICD-10-CM

## 2024-01-26 DIAGNOSIS — F32.A DEPRESSION, UNSPECIFIED DEPRESSION TYPE: ICD-10-CM

## 2024-01-26 DIAGNOSIS — R62.7 FAILURE TO THRIVE IN ADULT: ICD-10-CM

## 2024-01-26 DIAGNOSIS — Z79.899 ENCOUNTER FOR MEDICATION REVIEW: Primary | ICD-10-CM

## 2024-01-26 PROCEDURE — 99308 SBSQ NF CARE LOW MDM 20: CPT | Performed by: INTERNAL MEDICINE

## 2024-01-26 NOTE — PROGRESS NOTES
PROGRESS NOTE    Subjective   Chief complaint: Liseth Briggs is a 89 y.o. female who is a long term care patient being seen and evaluated for hospice.    HPI:  HPI  Patient is on hospice.  Patient did have a recent GDR of Zoloft to be decreased.  Patient is reported to be doing well with no issues, tolerated.  Mood is stable.  Nursing called and reported.  Pharmacy recommended for medication reduction as patient is on hospice.  Orders placed to obtain vitamin B and vitamin D level.  Patient seen and examined at bedside, no apparent distress.  Denies chest pain or shortness of breath.    Objective   Vital signs: 118/68, 98.0, 79, 16, 95%    Physical Exam  Constitutional:       General: She is not in acute distress.  Eyes:      Extraocular Movements: Extraocular movements intact.   Pulmonary:      Effort: Pulmonary effort is normal.   Musculoskeletal:      Cervical back: Neck supple.   Neurological:      Mental Status: She is alert.   Psychiatric:         Mood and Affect: Mood normal.         Behavior: Behavior is cooperative.         Assessment/Plan   Problem List Items Addressed This Visit       Severe vascular dementia with agitation (CMS/[facility])     Secured unit for safety  Assist with care         Depression      Zoloft   continue to monitor         Failure to thrive in adult     Hospice for comfort measures with comfort meds in place         Encounter for medication review - Primary     Obtain vitamin B12 and vitamin D level          Medications, treatments, and labs reviewed  Continue medications and treatments as listed in EMR    Scribe Attestation  I, Aisha Eastman   attest that this documentation has been prepared under the direction and in the presence of Velasquez Talley MD    Provider Attestation - Scribe documentation  All medical record entries made by the Scribe were at my direction and personally dictated by me. I have reviewed the chart and agree that the record accurately reflects my personal  performance of the history, physical exam, discussion and plan.   Velasquez Talley MD

## 2024-01-26 NOTE — LETTER
Patient: Liseth Briggs  : 1920    Encounter Date: 2024    PROGRESS NOTE    Subjective  Chief complaint: Liseth Briggs is a 89 y.o. female who is a long term care patient being seen and evaluated for hospice.    HPI:  HPI  Patient is on hospice.  Patient did have a recent GDR of Zoloft to be decreased.  Patient is reported to be doing well with no issues, tolerated.  Mood is stable.  Nursing called and reported.  Pharmacy recommended for medication reduction as patient is on hospice.  Orders placed to obtain vitamin B and vitamin D level.  Patient seen and examined at bedside, no apparent distress.  Denies chest pain or shortness of breath.    Objective  Vital signs: 118/68, 98.0, 79, 16, 95%    Physical Exam  Constitutional:       General: She is not in acute distress.  Eyes:      Extraocular Movements: Extraocular movements intact.   Pulmonary:      Effort: Pulmonary effort is normal.   Musculoskeletal:      Cervical back: Neck supple.   Neurological:      Mental Status: She is alert.   Psychiatric:         Mood and Affect: Mood normal.         Behavior: Behavior is cooperative.         Assessment/Plan  Problem List Items Addressed This Visit       Severe vascular dementia with agitation (CMS/[facility])     Secured unit for safety  Assist with care         Depression      Zoloft   continue to monitor         Failure to thrive in adult     Hospice for comfort measures with comfort meds in place         Encounter for medication review - Primary     Obtain vitamin B12 and vitamin D level          Medications, treatments, and labs reviewed  Continue medications and treatments as listed in EMR    Scribe Attestation  Tonya FORD Scribe   attest that this documentation has been prepared under the direction and in the presence of Velasquez Talley MD    Provider Attestation - Scribe documentation  All medical record entries made by the Scribe were at my direction and personally dictated by me. I have reviewed  the chart and agree that the record accurately reflects my personal performance of the history, physical exam, discussion and plan.   Velasquez Talley MD            Electronically Signed By: Velasquez Talley MD   1/27/24  6:03 PM

## 2024-01-31 ENCOUNTER — NURSING HOME VISIT (OUTPATIENT)
Dept: POST ACUTE CARE | Facility: EXTERNAL LOCATION | Age: 89
End: 2024-01-31
Payer: COMMERCIAL

## 2024-01-31 DIAGNOSIS — R62.7 FAILURE TO THRIVE IN ADULT: Primary | ICD-10-CM

## 2024-01-31 DIAGNOSIS — I10 HYPERTENSION, ESSENTIAL: ICD-10-CM

## 2024-01-31 DIAGNOSIS — Z79.899 ENCOUNTER FOR MEDICATION REVIEW: ICD-10-CM

## 2024-01-31 PROCEDURE — 99308 SBSQ NF CARE LOW MDM 20: CPT | Performed by: INTERNAL MEDICINE

## 2024-01-31 NOTE — LETTER
Patient: Liseth Briggs  : 1920    Encounter Date: 2024    PROGRESS NOTE    Subjective  Chief complaint: Liseth Briggs is a 89 y.o. female who is a long term care patient being seen and evaluated for medication review.    HPI:  HPI  Patient is on hospice.  Nursing called and reported that pharmacy recommended medication reduction as patient is on hospice.  Patient's vitamin B12 and vitamin D were discontinued.  Patient tolerating well.  Patient was seen and examined at bedside.  Patient appears to be in no apparent distress.    Objective  Vital signs: 118/68, 98.0, 79, 16, 95%    Physical Exam  Constitutional:       General: She is not in acute distress.  Eyes:      Extraocular Movements: Extraocular movements intact.   Pulmonary:      Effort: Pulmonary effort is normal.   Musculoskeletal:      Cervical back: Neck supple.   Neurological:      Mental Status: She is alert.   Psychiatric:         Mood and Affect: Mood normal.         Behavior: Behavior is cooperative.         Assessment/Plan  Problem List Items Addressed This Visit       Hypertension, essential     Losartan potassium  Metoprolol  Monitor blood pressure  AMIE diet         Failure to thrive in adult - Primary     Hospice for comfort measures with comfort meds in place         Encounter for medication review     Discontinue vitamin B 12 and vitamin D          Medications, treatments, and labs reviewed  Continue medications and treatments as listed in EMR    Scribe Attestation  I, Aisha Eastman   attest that this documentation has been prepared under the direction and in the presence of Velasquez Talley MD    Provider Attestation - Scribe documentation  All medical record entries made by the Scribe were at my direction and personally dictated by me. I have reviewed the chart and agree that the record accurately reflects my personal performance of the history, physical exam, discussion and plan.   Velasquez Talley,  MD            Electronically Signed By: Velasquez Talley MD   1/31/24  5:27 PM

## 2024-01-31 NOTE — PROGRESS NOTES
PROGRESS NOTE    Subjective   Chief complaint: Liseth Briggs is a 89 y.o. female who is a long term care patient being seen and evaluated for medication review.    HPI:  HPI  Patient is on hospice.  Nursing called and reported that pharmacy recommended medication reduction as patient is on hospice.  Patient's vitamin B12 and vitamin D were discontinued.  Patient tolerating well.  Patient was seen and examined at bedside.  Patient appears to be in no apparent distress.    Objective   Vital signs: 118/68, 98.0, 79, 16, 95%    Physical Exam  Constitutional:       General: She is not in acute distress.  Eyes:      Extraocular Movements: Extraocular movements intact.   Pulmonary:      Effort: Pulmonary effort is normal.   Musculoskeletal:      Cervical back: Neck supple.   Neurological:      Mental Status: She is alert.   Psychiatric:         Mood and Affect: Mood normal.         Behavior: Behavior is cooperative.         Assessment/Plan   Problem List Items Addressed This Visit       Hypertension, essential     Losartan potassium  Metoprolol  Monitor blood pressure  AMIE diet         Failure to thrive in adult - Primary     Hospice for comfort measures with comfort meds in place         Encounter for medication review     Discontinue vitamin B 12 and vitamin D          Medications, treatments, and labs reviewed  Continue medications and treatments as listed in EMR    Scribe Attestation  I, Clemente Eastmanibdestini   attest that this documentation has been prepared under the direction and in the presence of Velasquez Talley MD    Provider Attestation - Scribe documentation  All medical record entries made by the Scribe were at my direction and personally dictated by me. I have reviewed the chart and agree that the record accurately reflects my personal performance of the history, physical exam, discussion and plan.   Velasquez Talley MD

## 2024-02-07 ENCOUNTER — NURSING HOME VISIT (OUTPATIENT)
Dept: POST ACUTE CARE | Facility: EXTERNAL LOCATION | Age: 89
End: 2024-02-07
Payer: COMMERCIAL

## 2024-02-07 DIAGNOSIS — Z79.899 ENCOUNTER FOR MEDICATION REVIEW: ICD-10-CM

## 2024-02-07 DIAGNOSIS — R62.7 FAILURE TO THRIVE IN ADULT: Primary | ICD-10-CM

## 2024-02-07 PROCEDURE — 99308 SBSQ NF CARE LOW MDM 20: CPT | Performed by: INTERNAL MEDICINE

## 2024-02-07 NOTE — PROGRESS NOTES
PROGRESS NOTE    Subjective   Chief complaint: Liseth Briggs is a 89 y.o. female who is a long term care patient being seen and evaluated for med reduction    HPI:  HPI  Patient is seen in follow-up of medication reduction.  Patient is on hospice for comfort care measures.  Patient's vitamin B12 and vitamin D were recently discontinued due to pharmacy recommendations.  Patient tolerating well.  Patient seen and examined at bedside, appears to be in no apparent distress.    Objective   Vital signs: 118/68, 98.0, 79, 60, 95%    Physical Exam  Constitutional:       General: She is not in acute distress.  Eyes:      Extraocular Movements: Extraocular movements intact.   Pulmonary:      Effort: Pulmonary effort is normal.   Musculoskeletal:      Cervical back: Neck supple.   Neurological:      Mental Status: She is alert.   Psychiatric:         Mood and Affect: Mood normal.         Behavior: Behavior is cooperative.         Assessment/Plan   Problem List Items Addressed This Visit       Failure to thrive in adult - Primary     Hospice for comfort measures with comfort meds in place         Encounter for medication review     Discontinue vitamin B 12 and vitamin D  Tolerating          Medications, treatments, and labs reviewed  Continue medications and treatments as listed in EMR    Scribe Attestation  I, Aisha Eastman   attest that this documentation has been prepared under the direction and in the presence of Velasquez Talley MD    Provider Attestation - Scribe documentation  All medical record entries made by the Scribe were at my direction and personally dictated by me. I have reviewed the chart and agree that the record accurately reflects my personal performance of the history, physical exam, discussion and plan.   Velasquez Talley MD

## 2024-02-07 NOTE — LETTER
Patient: Liseth Briggs  : 1920    Encounter Date: 2024    PROGRESS NOTE    Subjective  Chief complaint: Liseth Briggs is a 89 y.o. female who is a long term care patient being seen and evaluated for med reduction    HPI:  HPI  Patient is seen in follow-up of medication reduction.  Patient is on hospice for comfort care measures.  Patient's vitamin B12 and vitamin D were recently discontinued due to pharmacy recommendations.  Patient tolerating well.  Patient seen and examined at bedside, appears to be in no apparent distress.    Objective  Vital signs: 118/68, 98.0, 79, 60, 95%    Physical Exam  Constitutional:       General: She is not in acute distress.  Eyes:      Extraocular Movements: Extraocular movements intact.   Pulmonary:      Effort: Pulmonary effort is normal.   Musculoskeletal:      Cervical back: Neck supple.   Neurological:      Mental Status: She is alert.   Psychiatric:         Mood and Affect: Mood normal.         Behavior: Behavior is cooperative.         Assessment/Plan  Problem List Items Addressed This Visit       Failure to thrive in adult - Primary     Hospice for comfort measures with comfort meds in place         Encounter for medication review     Discontinue vitamin B 12 and vitamin D  Tolerating          Medications, treatments, and labs reviewed  Continue medications and treatments as listed in EMR    Scribe Attestation  I, Aisha Eastman   attest that this documentation has been prepared under the direction and in the presence of Velasquez Talley MD    Provider Attestation - Scribe documentation  All medical record entries made by the Scribe were at my direction and personally dictated by me. I have reviewed the chart and agree that the record accurately reflects my personal performance of the history, physical exam, discussion and plan.   Velasquez Talley MD            Electronically Signed By: Velasquez Talley MD   24  4:51 PM

## 2024-02-09 ENCOUNTER — NURSING HOME VISIT (OUTPATIENT)
Dept: POST ACUTE CARE | Facility: EXTERNAL LOCATION | Age: 89
End: 2024-02-09
Payer: COMMERCIAL

## 2024-02-09 DIAGNOSIS — I10 HYPERTENSION, ESSENTIAL: ICD-10-CM

## 2024-02-09 DIAGNOSIS — R62.7 FAILURE TO THRIVE IN ADULT: ICD-10-CM

## 2024-02-09 DIAGNOSIS — L89.159 DECUBITUS ULCER OF COCCYGEAL REGION, UNSPECIFIED ULCER STAGE: Primary | ICD-10-CM

## 2024-02-09 PROCEDURE — 99308 SBSQ NF CARE LOW MDM 20: CPT | Performed by: INTERNAL MEDICINE

## 2024-02-09 NOTE — LETTER
Patient: Liseth Briggs  : 1920    Encounter Date: 2024    PROGRESS NOTE    Subjective  Chief complaint: Liseth Briggs is a 89 y.o. female who is a long term care patient being seen and evaluated for wound.    HPI:  HPI  Patient is on hospice.  Patient is seen due to nurse is calling in reporting patient had an open area to coccyx.  Orders have been placed for air mattress and foam dressing.  Patient seen and examined at bedside, appears to be in no apparent distress.    Objective  Vital signs: 118/68, 98.0, 79, 16, 95%    Physical Exam  Constitutional:       General: She is not in acute distress.  Eyes:      Extraocular Movements: Extraocular movements intact.   Pulmonary:      Effort: Pulmonary effort is normal.   Musculoskeletal:      Cervical back: Neck supple.   Skin:     Comments: Coccyx wound   Neurological:      Mental Status: She is alert.   Psychiatric:         Mood and Affect: Mood normal.         Behavior: Behavior is cooperative.         Assessment/Plan  Problem List Items Addressed This Visit       Hypertension, essential     Losartan potassium  Metoprolol  Monitor blood pressure  AMIE diet         Failure to thrive in adult     Hospice for comfort care measures          Pressure injury of skin of coccygeal region - Primary     Air mattress  Foam dressing  Wound management          Medications, treatments, and labs reviewed  Continue medications and treatments as listed in EMR    Scribe Attestation  ITonya Scribe   attest that this documentation has been prepared under the direction and in the presence of Velasquez Talley MD    Provider Attestation - Scribe documentation  All medical record entries made by the Scribe were at my direction and personally dictated by me. I have reviewed the chart and agree that the record accurately reflects my personal performance of the history, physical exam, discussion and plan.   Velasquez Talley MD            Electronically Signed By: Velasquez MCGUIRE  MD Mayank   2/10/24  8:44 AM

## 2024-02-09 NOTE — PROGRESS NOTES
PROGRESS NOTE    Subjective   Chief complaint: Liseth Briggs is a 89 y.o. female who is a long term care patient being seen and evaluated for wound.    HPI:  HPI  Patient is on hospice.  Patient is seen due to nurse is calling in reporting patient had an open area to coccyx.  Orders have been placed for air mattress and foam dressing.  Patient seen and examined at bedside, appears to be in no apparent distress.    Objective   Vital signs: 118/68, 98.0, 79, 16, 95%    Physical Exam  Constitutional:       General: She is not in acute distress.  Eyes:      Extraocular Movements: Extraocular movements intact.   Pulmonary:      Effort: Pulmonary effort is normal.   Musculoskeletal:      Cervical back: Neck supple.   Skin:     Comments: Coccyx wound   Neurological:      Mental Status: She is alert.   Psychiatric:         Mood and Affect: Mood normal.         Behavior: Behavior is cooperative.         Assessment/Plan   Problem List Items Addressed This Visit       Hypertension, essential     Losartan potassium  Metoprolol  Monitor blood pressure  AMIE diet         Failure to thrive in adult     Hospice for comfort care measures          Pressure injury of skin of coccygeal region - Primary     Air mattress  Foam dressing  Wound management          Medications, treatments, and labs reviewed  Continue medications and treatments as listed in EMR    Scribe Attestation  I, Clemente Eastmanibdestini   attest that this documentation has been prepared under the direction and in the presence of Velasquez Talley MD    Provider Attestation - Scribe documentation  All medical record entries made by the Scribe were at my direction and personally dictated by me. I have reviewed the chart and agree that the record accurately reflects my personal performance of the history, physical exam, discussion and plan.   Velasquez Talley MD

## 2024-02-13 ENCOUNTER — NURSING HOME VISIT (OUTPATIENT)
Dept: POST ACUTE CARE | Facility: EXTERNAL LOCATION | Age: 89
End: 2024-02-13
Payer: COMMERCIAL

## 2024-02-13 DIAGNOSIS — F01.C11 SEVERE VASCULAR DEMENTIA WITH AGITATION (MULTI): ICD-10-CM

## 2024-02-13 DIAGNOSIS — R62.7 FAILURE TO THRIVE IN ADULT: ICD-10-CM

## 2024-02-13 DIAGNOSIS — L89.150 UNSTAGEABLE PRESSURE ULCER OF SACRAL REGION (MULTI): Primary | ICD-10-CM

## 2024-02-13 PROCEDURE — 99308 SBSQ NF CARE LOW MDM 20: CPT | Performed by: NURSE PRACTITIONER

## 2024-02-13 NOTE — LETTER
Patient: Liseth Briggs  : 1920    Encounter Date: 2024    PROGRESS NOTE    Subjective  Chief complaint: Liseth Briggs is a 89 y.o. female who is a long term care patient being seen and evaluated for sacral pressure ulcer.    HPI:  HPI  Nursing called and reported patient had an open area to the sacrum.  Orders were placed for an air mattress and apply foam dressing.   Patient was seen and examined at bedside, appears to be in no apparent distress.  Patient is on hospice for comfort care measures.    Objective  Vital signs: 121/72, 97.8, 82, 18, 93%    Physical Exam  Constitutional:       General: She is not in acute distress.  Eyes:      Extraocular Movements: Extraocular movements intact.   Cardiovascular:      Rate and Rhythm: Normal rate.   Pulmonary:      Effort: Pulmonary effort is normal.   Musculoskeletal:      Cervical back: Neck supple.      Right lower leg: No edema.      Left lower leg: No edema.   Skin:     Comments: Wound location -sacrum  Etiology - unstageable pressure ulcer  Granulation - None  Slough - large  Edges - flat  Odor - none  Drainage - Medium serosanguineous  Size - 2.3 X 1 X UTD cm  Undermining -none   Neurological:      Mental Status: She is alert.   Psychiatric:         Mood and Affect: Mood normal.         Behavior: Behavior is cooperative.         Assessment/Plan  Problem List Items Addressed This Visit       Failure to thrive in adult     Hospice for comfort care measures          Severe vascular dementia with agitation (CMS/[facility])     Secured unit for safety  Assist with care         Unstageable pressure ulcer of sacral region (CMS/[facility]) - Primary     Patient is on hospice care  She is of advanced age 89 years old  Patient has multiple comorbidities including cerebrovascular disease, dementia, hypertension, polyneuropathy, glaucoma, depression, overactive bladder  She is incontinent  Patient has had a gradual weight loss over the past few years.  She is followed by the  dietitian and nutritional supplements.  Weight is monitored.  Patient requires wheelchair for mobility.  She has weekly skin checks and barrier cream to her coccyx.  Patient is to be in Broda chair when out of bed for comfort positioning and pressure reduction.  She has an air mattress in place and is on turn schedule.    Despite multiple interventions.  Patient developed wound to her sacrum making the wound and unavoidable pressure ulcer.    Will add pressure reducing cushion to chair    Change current treatment to cleanse open area with normal saline, cover with calcium alginate AG, foam daily and as needed          Medications, treatments, and labs reviewed  Continue medications and treatments as listed in EMR    Scribe Attestation  Tonya FORD Scribe   attest that this documentation has been prepared under the direction and in the presence of PETRA Timmons    Provider Attestation - Scribe documentation  All medical record entries made by the Scribe were at my direction and personally dictated by me. I have reviewed the chart and agree that the record accurately reflects my personal performance of the history, physical exam, discussion and plan.   PETRA Timmons            Electronically Signed By: PETRA Timmons   2/20/24  9:49 AM   pt declining stairs at this time despite encouragement, pt reporting she will be fine.

## 2024-02-19 PROBLEM — L89.150 UNSTAGEABLE PRESSURE ULCER OF SACRAL REGION (MULTI): Status: ACTIVE | Noted: 2024-02-19

## 2024-02-19 NOTE — PROGRESS NOTES
PROGRESS NOTE    Subjective   Chief complaint: Liseth Briggs is a 89 y.o. female who is a long term care patient being seen and evaluated for sacral pressure ulcer.    HPI:  HPI  Nursing called and reported patient had an open area to the sacrum.  Orders were placed for an air mattress and apply foam dressing.   Patient was seen and examined at bedside, appears to be in no apparent distress.  Patient is on hospice for comfort care measures.    Objective   Vital signs: 121/72, 97.8, 82, 18, 93%    Physical Exam  Constitutional:       General: She is not in acute distress.  Eyes:      Extraocular Movements: Extraocular movements intact.   Cardiovascular:      Rate and Rhythm: Normal rate.   Pulmonary:      Effort: Pulmonary effort is normal.   Musculoskeletal:      Cervical back: Neck supple.      Right lower leg: No edema.      Left lower leg: No edema.   Skin:     Comments: Wound location -sacrum  Etiology - unstageable pressure ulcer  Granulation - None  Slough - large  Edges - flat  Odor - none  Drainage - Medium serosanguineous  Size - 2.3 X 1 X UTD cm  Undermining -none   Neurological:      Mental Status: She is alert.   Psychiatric:         Mood and Affect: Mood normal.         Behavior: Behavior is cooperative.         Assessment/Plan   Problem List Items Addressed This Visit       Failure to thrive in adult     Hospice for comfort care measures          Severe vascular dementia with agitation (CMS/[facility])     Secured unit for safety  Assist with care         Unstageable pressure ulcer of sacral region (CMS/[facility]) - Primary     Patient is on hospice care  She is of advanced age 89 years old  Patient has multiple comorbidities including cerebrovascular disease, dementia, hypertension, polyneuropathy, glaucoma, depression, overactive bladder  She is incontinent  Patient has had a gradual weight loss over the past few years.  She is followed by the dietitian and nutritional supplements.  Weight is monitored.  Patient  requires wheelchair for mobility.  She has weekly skin checks and barrier cream to her coccyx.  Patient is to be in Broda chair when out of bed for comfort positioning and pressure reduction.  She has an air mattress in place and is on turn schedule.    Despite multiple interventions.  Patient developed wound to her sacrum making the wound and unavoidable pressure ulcer.    Will add pressure reducing cushion to chair    Change current treatment to cleanse open area with normal saline, cover with calcium alginate AG, foam daily and as needed          Medications, treatments, and labs reviewed  Continue medications and treatments as listed in EMR    Scribe Attestation  I, Aisha Eastman   attest that this documentation has been prepared under the direction and in the presence of PETRA Timmons    Provider Attestation - Scribe documentation  All medical record entries made by the Scribe were at my direction and personally dictated by me. I have reviewed the chart and agree that the record accurately reflects my personal performance of the history, physical exam, discussion and plan.   PETRA Timmons

## 2024-02-19 NOTE — ASSESSMENT & PLAN NOTE
Patient is on hospice care  She is of advanced age 89 years old  Patient has multiple comorbidities including cerebrovascular disease, dementia, hypertension, polyneuropathy, glaucoma, depression, overactive bladder  She is incontinent  Patient has had a gradual weight loss over the past few years.  She is followed by the dietitian and nutritional supplements.  Weight is monitored.  Patient requires wheelchair for mobility.  She has weekly skin checks and barrier cream to her coccyx.  Patient is to be in Broda chair when out of bed for comfort positioning and pressure reduction.  She has an air mattress in place and is on turn schedule.    Despite multiple interventions.  Patient developed wound to her sacrum making the wound and unavoidable pressure ulcer.    Will add pressure reducing cushion to chair    Change current treatment to cleanse open area with normal saline, cover with calcium alginate AG, foam daily and as needed

## 2024-02-23 ENCOUNTER — NURSING HOME VISIT (OUTPATIENT)
Dept: POST ACUTE CARE | Facility: EXTERNAL LOCATION | Age: 89
End: 2024-02-23
Payer: COMMERCIAL

## 2024-02-23 DIAGNOSIS — R62.7 FAILURE TO THRIVE IN ADULT: ICD-10-CM

## 2024-02-23 DIAGNOSIS — F32.A DEPRESSION, UNSPECIFIED DEPRESSION TYPE: ICD-10-CM

## 2024-02-23 DIAGNOSIS — L89.150 UNSTAGEABLE PRESSURE ULCER OF SACRAL REGION (MULTI): ICD-10-CM

## 2024-02-23 DIAGNOSIS — I10 HYPERTENSION, ESSENTIAL: ICD-10-CM

## 2024-02-23 DIAGNOSIS — F01.C11 SEVERE VASCULAR DEMENTIA WITH AGITATION (MULTI): Primary | ICD-10-CM

## 2024-02-23 PROCEDURE — 99309 SBSQ NF CARE MODERATE MDM 30: CPT | Performed by: INTERNAL MEDICINE

## 2024-02-23 NOTE — PROGRESS NOTES
PROGRESS NOTE    Subjective   Chief complaint: Liseth Briggs is a 89 y.o. female who is a long term care patient being seen and evaluated for monthly general medical care and follow-up.    HPI:  HPI  Patient presents for general medical care and f/u.  Patient seen and examined at bedside.  No issues per nursing.  Patient has no acute complaints.  Patient is on hospice for comfort care measures.  Patient has dementia and requires assist with ADLs.  HTN BP at goal.  Denies chest pain and headache.  Patient with diagnosis of depression.  Mood is stable.  Denies feeling down and thoughts of harming self or others.  Patient continues with sacral wound, being managed with dressing changes.  Mentation at baseline, no acute distress.    Objective   Vital signs: 122/69, 97.2, 74, 20, 92%    Physical Exam  Constitutional:       General: She is not in acute distress.  Eyes:      Extraocular Movements: Extraocular movements intact.   Pulmonary:      Effort: Pulmonary effort is normal.   Musculoskeletal:      Cervical back: Neck supple.   Skin:     Comments: Coccyx wound   Neurological:      Mental Status: She is alert.   Psychiatric:         Mood and Affect: Mood normal.         Behavior: Behavior is cooperative.         Assessment/Plan   Problem List Items Addressed This Visit       Severe vascular dementia with agitation (CMS/HCC) - Primary     Secured unit for safety  Assist with care         Hypertension, essential     Losartan potassium  Metoprolol  Monitor blood pressure  AMIE diet  BP controlled         Depression      Zoloft   continue to monitor         Failure to thrive in adult     Hospice for comfort care measures          Unstageable pressure ulcer of sacral region (CMS/HCC)     Patient is on hospice care  She is of advanced age 89 years old  Patient has multiple comorbidities including cerebrovascular disease, dementia, hypertension, polyneuropathy, glaucoma, depression, overactive bladder  She is  incontinent  Patient has had a gradual weight loss over the past few years.  She is followed by the dietitian and nutritional supplements.  Weight is monitored.  Patient requires wheelchair for mobility.  She has weekly skin checks and barrier cream to her coccyx.  Patient is to be in Broda chair when out of bed for comfort positioning and pressure reduction.  She has an air mattress in place and is on turn schedule.    Despite multiple interventions.  Patient developed wound to her sacrum making the wound and unavoidable pressure ulcer.    Will add pressure reducing cushion to chair    Change current treatment to cleanse open area with normal saline, cover with calcium alginate AG, foam daily and as needed          Medications, treatments, and labs reviewed  Continue medications and treatments as listed in EMR    Scribe Attestation  I, Aisha Eastman   attest that this documentation has been prepared under the direction and in the presence of Velasquez Talley MD    Provider Attestation - Scribe documentation  All medical record entries made by the Scribe were at my direction and personally dictated by me. I have reviewed the chart and agree that the record accurately reflects my personal performance of the history, physical exam, discussion and plan.   Velasquez Talley MD

## 2024-02-23 NOTE — LETTER
Patient: Liseth Briggs  : 1920    Encounter Date: 2024    PROGRESS NOTE    Subjective  Chief complaint: Liseth Briggs is a 89 y.o. female who is a long term care patient being seen and evaluated for monthly general medical care and follow-up.    HPI:  HPI  Patient presents for general medical care and f/u.  Patient seen and examined at bedside.  No issues per nursing.  Patient has no acute complaints.  Patient is on hospice for comfort care measures.  Patient has dementia and requires assist with ADLs.  HTN BP at goal.  Denies chest pain and headache.  Patient with diagnosis of depression.  Mood is stable.  Denies feeling down and thoughts of harming self or others.  Patient continues with sacral wound, being managed with dressing changes.  Mentation at baseline, no acute distress.    Objective  Vital signs: 122/69, 97.2, 74, 20, 92%    Physical Exam  Constitutional:       General: She is not in acute distress.  Eyes:      Extraocular Movements: Extraocular movements intact.   Pulmonary:      Effort: Pulmonary effort is normal.   Musculoskeletal:      Cervical back: Neck supple.   Skin:     Comments: Coccyx wound   Neurological:      Mental Status: She is alert.   Psychiatric:         Mood and Affect: Mood normal.         Behavior: Behavior is cooperative.         Assessment/Plan  Problem List Items Addressed This Visit       Severe vascular dementia with agitation (CMS/HCC) - Primary     Secured unit for safety  Assist with care         Hypertension, essential     Losartan potassium  Metoprolol  Monitor blood pressure  AMIE diet  BP controlled         Depression      Zoloft   continue to monitor         Failure to thrive in adult     Hospice for comfort care measures          Unstageable pressure ulcer of sacral region (CMS/HCC)     Patient is on hospice care  She is of advanced age 89 years old  Patient has multiple comorbidities including cerebrovascular disease, dementia, hypertension, polyneuropathy,  glaucoma, depression, overactive bladder  She is incontinent  Patient has had a gradual weight loss over the past few years.  She is followed by the dietitian and nutritional supplements.  Weight is monitored.  Patient requires wheelchair for mobility.  She has weekly skin checks and barrier cream to her coccyx.  Patient is to be in Broda chair when out of bed for comfort positioning and pressure reduction.  She has an air mattress in place and is on turn schedule.    Despite multiple interventions.  Patient developed wound to her sacrum making the wound and unavoidable pressure ulcer.    Will add pressure reducing cushion to chair    Change current treatment to cleanse open area with normal saline, cover with calcium alginate AG, foam daily and as needed          Medications, treatments, and labs reviewed  Continue medications and treatments as listed in EMR    Scribe Attestation  Tonya FORD Scribe   attest that this documentation has been prepared under the direction and in the presence of Velasquez Talley MD    Provider Attestation - Scribe documentation  All medical record entries made by the Scribe were at my direction and personally dictated by me. I have reviewed the chart and agree that the record accurately reflects my personal performance of the history, physical exam, discussion and plan.   Velasquez Talley MD            Electronically Signed By: Velasquez Talley MD   2/24/24 12:25 PM

## 2024-02-27 ENCOUNTER — NURSING HOME VISIT (OUTPATIENT)
Dept: POST ACUTE CARE | Facility: EXTERNAL LOCATION | Age: 89
End: 2024-02-27
Payer: COMMERCIAL

## 2024-02-27 DIAGNOSIS — F01.C11 SEVERE VASCULAR DEMENTIA WITH AGITATION (MULTI): ICD-10-CM

## 2024-02-27 DIAGNOSIS — L89.150 UNSTAGEABLE PRESSURE ULCER OF SACRAL REGION (MULTI): Primary | ICD-10-CM

## 2024-02-27 DIAGNOSIS — I10 HYPERTENSION, ESSENTIAL: ICD-10-CM

## 2024-02-27 PROCEDURE — 99308 SBSQ NF CARE LOW MDM 20: CPT | Performed by: NURSE PRACTITIONER

## 2024-02-27 NOTE — LETTER
Patient: Liseth Briggs  : 1920    Encounter Date: 2024    PROGRESS NOTE    Subjective  Chief complaint: Liseth Briggs is a 89 y.o. female who is a long term care patient being seen and evaluated for follow-up of wound.    HPI:  HPI  Patient is seen in follow-up to wound, sacrum, unstageable pressure ulcer.  Patient continues with treatment and dressing changes in place.  Patient is on hospice for comfort care measures.  Patient comfortable.    Objective  Vital signs: 119/71, 97.0, 80, 18, 95%    Physical Exam  Constitutional:       General: She is not in acute distress.  Eyes:      Extraocular Movements: Extraocular movements intact.   Cardiovascular:      Rate and Rhythm: Normal rate.   Pulmonary:      Effort: Pulmonary effort is normal.   Musculoskeletal:      Cervical back: Neck supple.      Right lower leg: No edema.      Left lower leg: No edema.   Skin:     Comments: Wound location -sacrum  Etiology - unstageable pressure ulcer  Granulation - medium  Slough - medium  Edges - flat  Odor - none  Drainage - Medium serosanguineous  Size - 1.5 X 1 X UTD cm  Undermining -none   Neurological:      Mental Status: She is alert.   Psychiatric:         Mood and Affect: Mood normal.         Behavior: Behavior is cooperative.         Assessment/Plan  Problem List Items Addressed This Visit       Hypertension, essential     Losartan potassium  Metoprolol  Monitor blood pressure  AMIE diet  BP controlled         Severe vascular dementia with agitation (CMS/HCC)     Secured unit for safety  Assist with care         Unstageable pressure ulcer of sacral region (CMS/HCC) - Primary     Patient is on hospice care  She is of advanced age 89 years old  Patient has multiple comorbidities including cerebrovascular disease, dementia, hypertension, polyneuropathy, glaucoma, depression, overactive bladder  She is incontinent  Patient has had a gradual weight loss over the past few years.  She is followed by the dietitian and  nutritional supplements.  Weight is monitored.  Patient requires wheelchair for mobility.  She has weekly skin checks and barrier cream to her coccyx.  Patient is to be in Broda chair when out of bed for comfort positioning and pressure reduction.  She has an air mattress in place and is on turn schedule.    Despite multiple interventions.  Patient developed wound to her sacrum making the wound and unavoidable pressure ulcer.    Pressure reducing cushion to chair was added    Continue current treatment to cleanse open area with normal saline, cover with calcium alginate AG, foam daily and as needed          Medications, treatments, and labs reviewed  Continue medications and treatments as listed in EMR    Scribe Attestation  I, Aisha Eastman   attest that this documentation has been prepared under the direction and in the presence of PETRA Timmons    Provider Attestation - Scribe documentation  All medical record entries made by the Scribe were at my direction and personally dictated by me. I have reviewed the chart and agree that the record accurately reflects my personal performance of the history, physical exam, discussion and plan.   PETRA Timmons            Electronically Signed By: PETRA Timmons   3/13/24 12:04 PM

## 2024-03-04 NOTE — PROGRESS NOTES
PROGRESS NOTE    Subjective   Chief complaint: Liseth Briggs is a 89 y.o. female who is a long term care patient being seen and evaluated for follow-up of wound.    HPI:  HPI  Patient is seen in follow-up to wound, sacrum, unstageable pressure ulcer.  Patient continues with treatment and dressing changes in place.  Patient is on hospice for comfort care measures.  Patient comfortable.    Objective   Vital signs: 119/71, 97.0, 80, 18, 95%    Physical Exam  Constitutional:       General: She is not in acute distress.  Eyes:      Extraocular Movements: Extraocular movements intact.   Cardiovascular:      Rate and Rhythm: Normal rate.   Pulmonary:      Effort: Pulmonary effort is normal.   Musculoskeletal:      Cervical back: Neck supple.      Right lower leg: No edema.      Left lower leg: No edema.   Skin:     Comments: Wound location -sacrum  Etiology - unstageable pressure ulcer  Granulation - medium  Slough - medium  Edges - flat  Odor - none  Drainage - Medium serosanguineous  Size - 1.5 X 1 X UTD cm  Undermining -none   Neurological:      Mental Status: She is alert.   Psychiatric:         Mood and Affect: Mood normal.         Behavior: Behavior is cooperative.         Assessment/Plan   Problem List Items Addressed This Visit       Hypertension, essential     Losartan potassium  Metoprolol  Monitor blood pressure  AMIE diet  BP controlled         Severe vascular dementia with agitation (CMS/HCC)     Secured unit for safety  Assist with care         Unstageable pressure ulcer of sacral region (CMS/HCC) - Primary     Patient is on hospice care  She is of advanced age 89 years old  Patient has multiple comorbidities including cerebrovascular disease, dementia, hypertension, polyneuropathy, glaucoma, depression, overactive bladder  She is incontinent  Patient has had a gradual weight loss over the past few years.  She is followed by the dietitian and nutritional supplements.  Weight is monitored.  Patient requires  wheelchair for mobility.  She has weekly skin checks and barrier cream to her coccyx.  Patient is to be in Broda chair when out of bed for comfort positioning and pressure reduction.  She has an air mattress in place and is on turn schedule.    Despite multiple interventions.  Patient developed wound to her sacrum making the wound and unavoidable pressure ulcer.    Pressure reducing cushion to chair was added    Continue current treatment to cleanse open area with normal saline, cover with calcium alginate AG, foam daily and as needed          Medications, treatments, and labs reviewed  Continue medications and treatments as listed in EMR    Scribe Attestation  I, Aisha Eastman   attest that this documentation has been prepared under the direction and in the presence of PETRA Timmons    Provider Attestation - Scribe documentation  All medical record entries made by the Scribe were at my direction and personally dictated by me. I have reviewed the chart and agree that the record accurately reflects my personal performance of the history, physical exam, discussion and plan.   PETRA Timmons

## 2024-03-04 NOTE — ASSESSMENT & PLAN NOTE
Patient is on hospice care  She is of advanced age 89 years old  Patient has multiple comorbidities including cerebrovascular disease, dementia, hypertension, polyneuropathy, glaucoma, depression, overactive bladder  She is incontinent  Patient has had a gradual weight loss over the past few years.  She is followed by the dietitian and nutritional supplements.  Weight is monitored.  Patient requires wheelchair for mobility.  She has weekly skin checks and barrier cream to her coccyx.  Patient is to be in Broda chair when out of bed for comfort positioning and pressure reduction.  She has an air mattress in place and is on turn schedule.    Despite multiple interventions.  Patient developed wound to her sacrum making the wound and unavoidable pressure ulcer.    Pressure reducing cushion to chair was added    Continue current treatment to cleanse open area with normal saline, cover with calcium alginate AG, foam daily and as needed

## 2024-03-05 ENCOUNTER — NURSING HOME VISIT (OUTPATIENT)
Dept: POST ACUTE CARE | Facility: EXTERNAL LOCATION | Age: 89
End: 2024-03-05
Payer: COMMERCIAL

## 2024-03-05 DIAGNOSIS — I10 HYPERTENSION, ESSENTIAL: ICD-10-CM

## 2024-03-05 DIAGNOSIS — L89.150 UNSTAGEABLE PRESSURE ULCER OF SACRAL REGION (MULTI): Primary | ICD-10-CM

## 2024-03-05 DIAGNOSIS — R62.7 FAILURE TO THRIVE IN ADULT: ICD-10-CM

## 2024-03-05 PROCEDURE — 99308 SBSQ NF CARE LOW MDM 20: CPT | Performed by: NURSE PRACTITIONER

## 2024-03-05 NOTE — LETTER
Patient: Liseth Briggs  : 1920    Encounter Date: 2024    PROGRESS NOTE    Subjective  Chief complaint: Liseth Briggs is a 89 y.o. female who is a long term care patient being seen and evaluated for follow-up wound.    HPI:  HPI  Patient is seen in follow-up to wound.  Patient continues with sacral unstageable pressure ulcer with treatment and interventions in place.  Patient is on hospice for comfort care measures.  Patient is seen and examined at bedside.    Objective  Vital signs: 119/71, 97.0, 80, 18, 95%    Physical Exam  Constitutional:       General: She is not in acute distress.  Eyes:      Extraocular Movements: Extraocular movements intact.   Cardiovascular:      Rate and Rhythm: Normal rate.   Pulmonary:      Effort: Pulmonary effort is normal.   Musculoskeletal:      Cervical back: Neck supple.      Right lower leg: No edema.      Left lower leg: No edema.   Skin:     Comments: Wound location -sacrum  Etiology - unstageable pressure ulcer  Granulation - small  Slough - large  Edges - flat  Odor - none  Drainage - Medium serosanguineous  Size - 1 X 0.7 X UTD cm  Undermining -none   Neurological:      Mental Status: She is alert.   Psychiatric:         Mood and Affect: Mood normal.         Behavior: Behavior is cooperative.         Assessment/Plan  Problem List Items Addressed This Visit       Hypertension, essential     Losartan potassium  Metoprolol  Monitor blood pressure  AMIE diet  BP controlled         Failure to thrive in adult     Hospice for comfort care measures          Unstageable pressure ulcer of sacral region (CMS/HCC) - Primary     Patient is on hospice care  She is of advanced age 89 years old  Patient has multiple comorbidities including cerebrovascular disease, dementia, hypertension, polyneuropathy, glaucoma, depression, overactive bladder  She is incontinent  Patient has had a gradual weight loss over the past few years.  She is followed by the dietitian and nutritional  supplements.  Weight is monitored.  Patient requires wheelchair for mobility.  She has weekly skin checks and barrier cream to her coccyx.  Patient is to be in Broda chair when out of bed for comfort positioning and pressure reduction.  She has an air mattress in place and is on turn schedule.    Despite multiple interventions.  Patient developed wound to her sacrum making the wound and unavoidable pressure ulcer.    Will add pressure reducing cushion to chair    Change current treatment to cleanse open area with normal saline, cover with calcium alginate AG, foam daily and as needed          Medications, treatments, and labs reviewed  Continue medications and treatments as listed in EMR    Scribe Attestation  ITonya Scribe   attest that this documentation has been prepared under the direction and in the presence of PETRA Timmons    Provider Attestation - Scribe documentation  All medical record entries made by the Scribe were at my direction and personally dictated by me. I have reviewed the chart and agree that the record accurately reflects my personal performance of the history, physical exam, discussion and plan.   PETRA Timmons            Electronically Signed By: PETRA Timmons   3/13/24  1:32 PM

## 2024-03-11 NOTE — PROGRESS NOTES
PROGRESS NOTE    Subjective   Chief complaint: Liseth Briggs is a 89 y.o. female who is a long term care patient being seen and evaluated for follow-up wound.    HPI:  HPI  Patient is seen in follow-up to wound.  Patient continues with sacral unstageable pressure ulcer with treatment and interventions in place.  Patient is on hospice for comfort care measures.  Patient is seen and examined at bedside.    Objective   Vital signs: 119/71, 97.0, 80, 18, 95%    Physical Exam  Constitutional:       General: She is not in acute distress.  Eyes:      Extraocular Movements: Extraocular movements intact.   Cardiovascular:      Rate and Rhythm: Normal rate.   Pulmonary:      Effort: Pulmonary effort is normal.   Musculoskeletal:      Cervical back: Neck supple.      Right lower leg: No edema.      Left lower leg: No edema.   Skin:     Comments: Wound location -sacrum  Etiology - unstageable pressure ulcer  Granulation - small  Slough - large  Edges - flat  Odor - none  Drainage - Medium serosanguineous  Size - 1 X 0.7 X UTD cm  Undermining -none   Neurological:      Mental Status: She is alert.   Psychiatric:         Mood and Affect: Mood normal.         Behavior: Behavior is cooperative.         Assessment/Plan   Problem List Items Addressed This Visit       Hypertension, essential     Losartan potassium  Metoprolol  Monitor blood pressure  AMIE diet  BP controlled         Failure to thrive in adult     Hospice for comfort care measures          Unstageable pressure ulcer of sacral region (CMS/HCC) - Primary     Patient is on hospice care  She is of advanced age 89 years old  Patient has multiple comorbidities including cerebrovascular disease, dementia, hypertension, polyneuropathy, glaucoma, depression, overactive bladder  She is incontinent  Patient has had a gradual weight loss over the past few years.  She is followed by the dietitian and nutritional supplements.  Weight is monitored.  Patient requires wheelchair for  mobility.  She has weekly skin checks and barrier cream to her coccyx.  Patient is to be in Broda chair when out of bed for comfort positioning and pressure reduction.  She has an air mattress in place and is on turn schedule.    Despite multiple interventions.  Patient developed wound to her sacrum making the wound and unavoidable pressure ulcer.    Will add pressure reducing cushion to chair    Change current treatment to cleanse open area with normal saline, cover with calcium alginate AG, foam daily and as needed          Medications, treatments, and labs reviewed  Continue medications and treatments as listed in EMR    Scribe Attestation  I, Aisha Eastman   attest that this documentation has been prepared under the direction and in the presence of PETRA Timmons    Provider Attestation - Scribe documentation  All medical record entries made by the Scribe were at my direction and personally dictated by me. I have reviewed the chart and agree that the record accurately reflects my personal performance of the history, physical exam, discussion and plan.   PETRA Timmons

## 2024-03-18 ENCOUNTER — NURSING HOME VISIT (OUTPATIENT)
Dept: POST ACUTE CARE | Facility: EXTERNAL LOCATION | Age: 89
End: 2024-03-18
Payer: COMMERCIAL

## 2024-03-18 DIAGNOSIS — R62.7 FAILURE TO THRIVE IN ADULT: ICD-10-CM

## 2024-03-18 DIAGNOSIS — I10 HYPERTENSION, ESSENTIAL: ICD-10-CM

## 2024-03-18 DIAGNOSIS — R19.7 DIARRHEA, UNSPECIFIED TYPE: Primary | ICD-10-CM

## 2024-03-18 PROCEDURE — 99308 SBSQ NF CARE LOW MDM 20: CPT | Performed by: NURSE PRACTITIONER

## 2024-03-18 NOTE — LETTER
Patient: Liseth Briggs  : 1920    Encounter Date: 2024    PROGRESS NOTE    Subjective  Chief complaint: Liseth Briggs is a 89 y.o. female who is a long term care patient being seen and evaluated for diarrhea.    HPI:  HPI  Patient is on hospice for comfort care measures.  Nurse reporting patient with diarrhea.  Orders were given to obtain stool for C. difficile.  Patient denies abdominal pain.  Denies nausea or vomiting.  Denies fever or chills.    Objective  Vital signs: 119/71, 97.0, 80, 18, 95%    Physical Exam  Constitutional:       General: She is not in acute distress.  HENT:      Ears:      Comments: Hard of hearing  Eyes:      Extraocular Movements: Extraocular movements intact.      Comments: Blind   Pulmonary:      Effort: Pulmonary effort is normal.   Musculoskeletal:      Cervical back: Neck supple.   Neurological:      Mental Status: She is alert.   Psychiatric:         Mood and Affect: Mood normal.         Behavior: Behavior is cooperative.         Assessment/Plan  Problem List Items Addressed This Visit       Hypertension, essential     Losartan potassium  Metoprolol  Monitor blood pressure  AMIE diet  BP controlled         Failure to thrive in adult     Hospice for comfort care measures          Diarrhea - Primary     Stool for C. difficile is pending          Medications, treatments, and labs reviewed  Continue medications and treatments as listed in EMR    Scribe Attestation  ITonya Scribe   attest that this documentation has been prepared under the direction and in the presence of PETRA Timmons    Provider Attestation - Scribe documentation  All medical record entries made by the Scribe were at my direction and personally dictated by me. I have reviewed the chart and agree that the record accurately reflects my personal performance of the history, physical exam, discussion and plan.   PETRA Timmons            Electronically Signed By: Sherley AMIN  TERRANCE Abarca-CNP   3/21/24  6:43 PM

## 2024-03-19 ENCOUNTER — NURSING HOME VISIT (OUTPATIENT)
Dept: POST ACUTE CARE | Facility: EXTERNAL LOCATION | Age: 89
End: 2024-03-19
Payer: COMMERCIAL

## 2024-03-19 DIAGNOSIS — E46 MALNUTRITION, UNSPECIFIED TYPE (MULTI): Primary | ICD-10-CM

## 2024-03-19 DIAGNOSIS — F01.C11 SEVERE VASCULAR DEMENTIA WITH AGITATION (MULTI): ICD-10-CM

## 2024-03-19 DIAGNOSIS — R62.7 FAILURE TO THRIVE IN ADULT: ICD-10-CM

## 2024-03-19 PROCEDURE — 99308 SBSQ NF CARE LOW MDM 20: CPT | Performed by: NURSE PRACTITIONER

## 2024-03-19 NOTE — LETTER
Patient: Liseth Briggs  : 1920    Encounter Date: 2024    PROGRESS NOTE    Subjective  Chief complaint: Liseth Briggs is a 89 y.o. female who is a long term care patient being seen and evaluated for follow-up.    HPI:  HPI  Patient is on hospice for comfort care measures.  Mini nutrition eval was completed and shows patient is malnourished.  Patient is followed by the dietician.  Denies n/v/f/c abdominal pain.  Patient appears comfortable, seen and examined at the bedside.    Objective  Vital signs: 119/71, 97.0, 80, 18, 95%    Physical Exam  Constitutional:       General: She is not in acute distress.  HENT:      Ears:      Comments: Hard of hearing  Eyes:      Extraocular Movements: Extraocular movements intact.      Comments: Blind   Pulmonary:      Effort: Pulmonary effort is normal.   Musculoskeletal:      Cervical back: Neck supple.   Neurological:      Mental Status: She is alert.   Psychiatric:         Mood and Affect: Mood normal.         Behavior: Behavior is cooperative.         Assessment/Plan  Problem List Items Addressed This Visit       Severe vascular dementia with agitation (CMS/[facility])     Secured unit for safety  Assist with care         Failure to thrive in adult     Hospice for comfort care measures          Malnutrition (CMS/[facility]) - Primary     Dietitian following  Monitor weights          Medications, treatments, and labs reviewed  Continue medications and treatments as listed in EMR    Scribe Attestation  ITonya Scribe   attest that this documentation has been prepared under the direction and in the presence of TERRANCE Timmons-CNP    Provider Attestation - Scribe documentation  All medical record entries made by the Scribe were at my direction and personally dictated by me. I have reviewed the chart and agree that the record accurately reflects my personal performance of the history, physical exam, discussion and plan.   Sherley Abarca  APRN-CNP            Electronically Signed By: PETRA Timmons   4/1/24  7:35 PM

## 2024-03-20 ENCOUNTER — NURSING HOME VISIT (OUTPATIENT)
Dept: POST ACUTE CARE | Facility: EXTERNAL LOCATION | Age: 89
End: 2024-03-20
Payer: COMMERCIAL

## 2024-03-20 DIAGNOSIS — R62.7 FAILURE TO THRIVE IN ADULT: ICD-10-CM

## 2024-03-20 DIAGNOSIS — F01.C11 SEVERE VASCULAR DEMENTIA WITH AGITATION (MULTI): ICD-10-CM

## 2024-03-20 DIAGNOSIS — R19.7 DIARRHEA, UNSPECIFIED TYPE: Primary | ICD-10-CM

## 2024-03-20 PROCEDURE — 99308 SBSQ NF CARE LOW MDM 20: CPT | Performed by: INTERNAL MEDICINE

## 2024-03-20 NOTE — LETTER
Patient: Liseth Briggs  : 1920    Encounter Date: 2024    PROGRESS NOTE    Subjective  Chief complaint: Liseth Briggs is a 89 y.o. female who is a long term care patient being seen and evaluated for diarrhea.    HPI:  HPI  Patient continues on hospice for comfort care measures.  Patient is seen in follow-up for diarrhea.  Patient did have a stool obtained for C. difficile, pending results.  Afebrile.    Objective  Vital signs: 119/71, 97.0, 80, 18, 95%    Physical Exam  Constitutional:       General: She is not in acute distress.  Eyes:      Extraocular Movements: Extraocular movements intact.   Pulmonary:      Effort: Pulmonary effort is normal.   Musculoskeletal:      Cervical back: Neck supple.   Skin:     Comments: Sacral dressing clean dry and intact   Neurological:      Mental Status: She is alert.   Psychiatric:         Mood and Affect: Mood normal.         Behavior: Behavior is cooperative.         Assessment/Plan  Problem List Items Addressed This Visit       Severe vascular dementia with agitation (CMS/[facility])     Secured unit for safety  Assist with care         Failure to thrive in adult     Hospice for comfort care measures          Diarrhea - Primary     Stool for C. difficile is pending          Medications, treatments, and labs reviewed  Continue medications and treatments as listed in EMR    Scribe Attestation  I, Aisha Eastman   attest that this documentation has been prepared under the direction and in the presence of Velasquez Talley MD    Provider Attestation - Scribe documentation  All medical record entries made by the Scribe were at my direction and personally dictated by me. I have reviewed the chart and agree that the record accurately reflects my personal performance of the history, physical exam, discussion and plan.   Velasquez Talley MD            Electronically Signed By: Velasquez Talley MD   3/20/24  3:17 PM

## 2024-03-20 NOTE — PROGRESS NOTES
PROGRESS NOTE    Subjective   Chief complaint: Liseth Briggs is a 89 y.o. female who is a long term care patient being seen and evaluated for diarrhea.    HPI:  HPI  Patient continues on hospice for comfort care measures.  Patient is seen in follow-up for diarrhea.  Patient did have a stool obtained for C. difficile, pending results.  Afebrile.    Objective   Vital signs: 119/71, 97.0, 80, 18, 95%    Physical Exam  Constitutional:       General: She is not in acute distress.  Eyes:      Extraocular Movements: Extraocular movements intact.   Pulmonary:      Effort: Pulmonary effort is normal.   Musculoskeletal:      Cervical back: Neck supple.   Skin:     Comments: Sacral dressing clean dry and intact   Neurological:      Mental Status: She is alert.   Psychiatric:         Mood and Affect: Mood normal.         Behavior: Behavior is cooperative.         Assessment/Plan   Problem List Items Addressed This Visit       Severe vascular dementia with agitation (CMS/[facility])     Secured unit for safety  Assist with care         Failure to thrive in adult     Hospice for comfort care measures          Diarrhea - Primary     Stool for C. difficile is pending          Medications, treatments, and labs reviewed  Continue medications and treatments as listed in EMR    Scribe Attestation  I, Aisha Eastman   attest that this documentation has been prepared under the direction and in the presence of Velasquez Talley MD    Provider Attestation - Scribe documentation  All medical record entries made by the Scribe were at my direction and personally dictated by me. I have reviewed the chart and agree that the record accurately reflects my personal performance of the history, physical exam, discussion and plan.   Velasquez Talley MD

## 2024-03-21 NOTE — PROGRESS NOTES
PROGRESS NOTE    Subjective   Chief complaint: Liseth Briggs is a 89 y.o. female who is a long term care patient being seen and evaluated for diarrhea.    HPI:  HPI  Patient is on hospice for comfort care measures.  Nurse reporting patient with diarrhea.  Orders were given to obtain stool for C. difficile.  Patient denies abdominal pain.  Denies nausea or vomiting.  Denies fever or chills.    Objective   Vital signs: 119/71, 97.0, 80, 18, 95%    Physical Exam  Constitutional:       General: She is not in acute distress.  HENT:      Ears:      Comments: Hard of hearing  Eyes:      Extraocular Movements: Extraocular movements intact.      Comments: Blind   Pulmonary:      Effort: Pulmonary effort is normal.   Musculoskeletal:      Cervical back: Neck supple.   Neurological:      Mental Status: She is alert.   Psychiatric:         Mood and Affect: Mood normal.         Behavior: Behavior is cooperative.         Assessment/Plan   Problem List Items Addressed This Visit       Hypertension, essential     Losartan potassium  Metoprolol  Monitor blood pressure  AMIE diet  BP controlled         Failure to thrive in adult     Hospice for comfort care measures          Diarrhea - Primary     Stool for C. difficile is pending          Medications, treatments, and labs reviewed  Continue medications and treatments as listed in EMR    Scribe Attestation  I, Aisha Eastman   attest that this documentation has been prepared under the direction and in the presence of PETRA Timmons    Provider Attestation - Scribe documentation  All medical record entries made by the Scribe were at my direction and personally dictated by me. I have reviewed the chart and agree that the record accurately reflects my personal performance of the history, physical exam, discussion and plan.   PETRA Timmons

## 2024-03-22 ENCOUNTER — NURSING HOME VISIT (OUTPATIENT)
Dept: POST ACUTE CARE | Facility: EXTERNAL LOCATION | Age: 89
End: 2024-03-22
Payer: COMMERCIAL

## 2024-03-22 DIAGNOSIS — F01.C11 SEVERE VASCULAR DEMENTIA WITH AGITATION (MULTI): ICD-10-CM

## 2024-03-22 DIAGNOSIS — R62.7 FAILURE TO THRIVE IN ADULT: ICD-10-CM

## 2024-03-22 DIAGNOSIS — I10 HYPERTENSION, ESSENTIAL: ICD-10-CM

## 2024-03-22 DIAGNOSIS — F32.A DEPRESSION, UNSPECIFIED DEPRESSION TYPE: ICD-10-CM

## 2024-03-22 DIAGNOSIS — A49.8 CLOSTRIDIUM DIFFICILE INFECTION: Primary | ICD-10-CM

## 2024-03-22 PROCEDURE — 99309 SBSQ NF CARE MODERATE MDM 30: CPT | Performed by: INTERNAL MEDICINE

## 2024-03-22 NOTE — LETTER
Patient: Liseth Briggs  : 1920    Encounter Date: 2024    PROGRESS NOTE    Subjective  Chief complaint: Liseth Briggs is a 89 y.o. female who is a long term care patient being seen and evaluated for monthly general medical care and follow-up in seated.    HPI:  HPI  Patient presents for general medical care and f/u.  Patient seen and examined at bedside.  No issues per nursing.  Patient does continue on antibiotics for C. difficile, on isolation precautions.  Patient is also on hospice for comfort care measures.  Patient on secured unit for safety.  Patient has dementia and requires assist with ADLs.  Patient is alert and oriented x 1.  HTN BP at goal.  Denies chest pain and headache.  Patient with diagnosis of depression.  Mood is stable.  Denies feeling down and thoughts of harming self or others.  Mentation at baseline, no acute distress.    Objective  Vital signs: 119/71, 97.0, 80, 18, 95%    Physical Exam  Constitutional:       General: She is not in acute distress.  Eyes:      Extraocular Movements: Extraocular movements intact.   Pulmonary:      Effort: Pulmonary effort is normal.   Musculoskeletal:      Cervical back: Neck supple.   Skin:     Comments: Sacral dressing clean dry and intact   Neurological:      Mental Status: She is alert.   Psychiatric:         Mood and Affect: Mood normal.         Behavior: Behavior is cooperative.         Assessment/Plan  Problem List Items Addressed This Visit       Severe vascular dementia with agitation (CMS/[facility])     Secured unit for safety  Assist with care         Hypertension, essential     Losartan potassium  Metoprolol  Monitor blood pressure  AMIE diet  BP controlled         Depression      Zoloft   continue to monitor         Failure to thrive in adult     Hospice for comfort care measures          Clostridium difficile infection - Primary     Vanco until complete.  2024  Isolation precautions          Medications, treatments, and labs  reviewed  Continue medications and treatments as listed in EMR    Scribe Attestation  I, Aisha Eastman   attest that this documentation has been prepared under the direction and in the presence of Velasquez Talley MD    Provider Attestation - Scribe documentation  All medical record entries made by the Scribe were at my direction and personally dictated by me. I have reviewed the chart and agree that the record accurately reflects my personal performance of the history, physical exam, discussion and plan.   Velasquez Talley MD            Electronically Signed By: Velasquez Talley MD   3/22/24  2:25 PM

## 2024-03-22 NOTE — PROGRESS NOTES
PROGRESS NOTE    Subjective   Chief complaint: Liseth Briggs is a 89 y.o. female who is a long term care patient being seen and evaluated for monthly general medical care and follow-up in seated.    HPI:  HPI  Patient presents for general medical care and f/u.  Patient seen and examined at bedside.  No issues per nursing.  Patient does continue on antibiotics for C. difficile, on isolation precautions.  Patient is also on hospice for comfort care measures.  Patient on secured unit for safety.  Patient has dementia and requires assist with ADLs.  Patient is alert and oriented x 1.  HTN BP at goal.  Denies chest pain and headache.  Patient with diagnosis of depression.  Mood is stable.  Denies feeling down and thoughts of harming self or others.  Mentation at baseline, no acute distress.    Objective   Vital signs: 119/71, 97.0, 80, 18, 95%    Physical Exam  Constitutional:       General: She is not in acute distress.  Eyes:      Extraocular Movements: Extraocular movements intact.   Pulmonary:      Effort: Pulmonary effort is normal.   Musculoskeletal:      Cervical back: Neck supple.   Skin:     Comments: Sacral dressing clean dry and intact   Neurological:      Mental Status: She is alert.   Psychiatric:         Mood and Affect: Mood normal.         Behavior: Behavior is cooperative.         Assessment/Plan   Problem List Items Addressed This Visit       Severe vascular dementia with agitation (CMS/[facility])     Secured unit for safety  Assist with care         Hypertension, essential     Losartan potassium  Metoprolol  Monitor blood pressure  AMIE diet  BP controlled         Depression      Zoloft   continue to monitor         Failure to thrive in adult     Hospice for comfort care measures          Clostridium difficile infection - Primary     Vanco until complete.  4/4/2024  Isolation precautions          Medications, treatments, and labs reviewed  Continue medications and treatments as listed in EMR    Scribe  Attestation  I, Aisha Eastman   attest that this documentation has been prepared under the direction and in the presence of Velasquez Talley MD    Provider Attestation - Scribe documentation  All medical record entries made by the Scribe were at my direction and personally dictated by me. I have reviewed the chart and agree that the record accurately reflects my personal performance of the history, physical exam, discussion and plan.   Velasquez Talley MD

## 2024-03-26 ENCOUNTER — NURSING HOME VISIT (OUTPATIENT)
Dept: POST ACUTE CARE | Facility: EXTERNAL LOCATION | Age: 89
End: 2024-03-26
Payer: COMMERCIAL

## 2024-03-26 DIAGNOSIS — L89.153 STAGE III PRESSURE ULCER OF SACRAL REGION (MULTI): Primary | ICD-10-CM

## 2024-03-26 DIAGNOSIS — R62.7 FAILURE TO THRIVE IN ADULT: ICD-10-CM

## 2024-03-26 DIAGNOSIS — N32.81 OVERACTIVE BLADDER: ICD-10-CM

## 2024-03-26 PROCEDURE — 99308 SBSQ NF CARE LOW MDM 20: CPT | Performed by: NURSE PRACTITIONER

## 2024-03-26 NOTE — LETTER
Patient: Liseth Briggs  : 1920    Encounter Date: 2024    PROGRESS NOTE    Subjective  Chief complaint: Liseth Briggs is a 89 y.o. female who is a long term care patient being seen and evaluated for follow-up.    HPI:  HPI  Patient does continue on hospice for comfort care measures.  Asked to see patient to evaluate for continued use of oxybutynin.  Patient also with sacral ulcer and presents for fu.  Continues with dressing changes.    Objective  Vital signs: 119/71, 97.0, 80, 18, 95%    Physical Exam  Constitutional:       General: She is not in acute distress.  Eyes:      Extraocular Movements: Extraocular movements intact.   Pulmonary:      Effort: Pulmonary effort is normal.   Musculoskeletal:      Cervical back: Neck supple.   Skin:     Comments: Wound location -sacrum  Etiology - pressure   Granulation - large  Slough - small  Edges - flat  Odor - none  Drainage - Medium serosanguineous  Size - 0.5 X 0.5 X 0.3 cm  Undermining -none      Neurological:      Mental Status: She is alert.   Psychiatric:         Mood and Affect: Mood normal.         Behavior: Behavior is cooperative.         Assessment/Plan  Problem List Items Addressed This Visit       Failure to thrive in adult     Hospice for comfort care measures          Overactive bladder     Discontinue oxybutynin         Stage III pressure ulcer of sacral region (CMS/HCC) - Primary     Wound now stage 3    Patient is on hospice care  She is of advanced age 89 years old  Patient has multiple comorbidities including cerebrovascular disease, dementia, hypertension, polyneuropathy, glaucoma, depression, overactive bladder  She is incontinent  Patient has had a gradual weight loss over the past few years.  She is followed by the dietitian and nutritional supplements.  Weight is monitored.  Patient requires wheelchair for mobility.  Patient is to be in Broda chair when out of bed for comfort positioning and pressure reduction.  She has an air mattress  in place, is on turn schedule, and has pressure reducing cushion to chair    TX: Cleanse open area with normal saline, cover with calcium alginate AG, foam daily and as neede          Medications, treatments, and labs reviewed  Continue medications and treatments as listed in EMR    Scribe Attestation  Tonya FORD Scribdestini   attest that this documentation has been prepared under the direction and in the presence of PETRA Timmons    Provider Attestation - Scribe documentation  All medical record entries made by the Scribe were at my direction and personally dictated by me. I have reviewed the chart and agree that the record accurately reflects my personal performance of the history, physical exam, discussion and plan.   PETRA Timmons            Electronically Signed By: PETRA Timmons   4/3/24  2:52 PM

## 2024-03-27 ENCOUNTER — NURSING HOME VISIT (OUTPATIENT)
Dept: POST ACUTE CARE | Facility: EXTERNAL LOCATION | Age: 89
End: 2024-03-27
Payer: COMMERCIAL

## 2024-03-27 DIAGNOSIS — R62.7 FAILURE TO THRIVE IN ADULT: Primary | ICD-10-CM

## 2024-03-27 DIAGNOSIS — I10 HYPERTENSION, ESSENTIAL: ICD-10-CM

## 2024-03-27 DIAGNOSIS — A49.8 CLOSTRIDIUM DIFFICILE INFECTION: ICD-10-CM

## 2024-03-27 PROCEDURE — 99308 SBSQ NF CARE LOW MDM 20: CPT | Performed by: INTERNAL MEDICINE

## 2024-03-27 NOTE — LETTER
Patient: Liseth Briggs  : 1920    Encounter Date: 2024    PROGRESS NOTE    Subjective  Chief complaint: Liseth Briggs is a 89 y.o. female who is a long term care patient being seen and evaluated for C. difficile.    HPI:  HPI  Patient is seen in follow-up for C. difficile.  Patient does continue on Vanco, tolerating without adverse effects.  Patient does continue with diarrhea.  Patient does remain on isolation precautions.  No nausea or vomiting.  Afebrile.  Patient is on hospice for comfort care measures.    Objective  Vital signs: 119/71, 97.0, 80, 95%    Physical Exam  Constitutional:       General: She is not in acute distress.  Eyes:      Extraocular Movements: Extraocular movements intact.   Pulmonary:      Effort: Pulmonary effort is normal.   Musculoskeletal:      Cervical back: Neck supple.   Skin:     Comments: Sacral dressing clean dry and intact   Neurological:      Mental Status: She is alert.   Psychiatric:         Mood and Affect: Mood normal.         Behavior: Behavior is cooperative.         Assessment/Plan  Problem List Items Addressed This Visit       Hypertension, essential     Losartan potassium  Metoprolol  Monitor blood pressure  AMIE diet  BP controlled         Failure to thrive in adult - Primary     Hospice for comfort care measures          Clostridium difficile infection     Vanco until complete.  2024  Isolation precautions          Medications, treatments, and labs reviewed  Continue medications and treatments as listed in EMR    Scribe Attestation  I, Aisha Eastman   attest that this documentation has been prepared under the direction and in the presence of Velasquez Talley MD    Provider Attestation - Scribe documentation  All medical record entries made by the Scribe were at my direction and personally dictated by me. I have reviewed the chart and agree that the record accurately reflects my personal performance of the history, physical exam, discussion and  plan.   Velasquez Talley MD            Electronically Signed By: Velasquez Talley MD   3/27/24  6:30 PM

## 2024-03-27 NOTE — PROGRESS NOTES
PROGRESS NOTE    Subjective   Chief complaint: Liseth Briggs is a 89 y.o. female who is a long term care patient being seen and evaluated for C. difficile.    HPI:  HPI  Patient is seen in follow-up for C. difficile.  Patient does continue on Vanco, tolerating without adverse effects.  Patient does continue with diarrhea.  Patient does remain on isolation precautions.  No nausea or vomiting.  Afebrile.  Patient is on hospice for comfort care measures.    Objective   Vital signs: 119/71, 97.0, 80, 95%    Physical Exam  Constitutional:       General: She is not in acute distress.  Eyes:      Extraocular Movements: Extraocular movements intact.   Pulmonary:      Effort: Pulmonary effort is normal.   Musculoskeletal:      Cervical back: Neck supple.   Skin:     Comments: Sacral dressing clean dry and intact   Neurological:      Mental Status: She is alert.   Psychiatric:         Mood and Affect: Mood normal.         Behavior: Behavior is cooperative.         Assessment/Plan   Problem List Items Addressed This Visit       Hypertension, essential     Losartan potassium  Metoprolol  Monitor blood pressure  AMIE diet  BP controlled         Failure to thrive in adult - Primary     Hospice for comfort care measures          Clostridium difficile infection     Vanco until complete.  4/4/2024  Isolation precautions          Medications, treatments, and labs reviewed  Continue medications and treatments as listed in EMR    Scribe Attestation  I, Aisha Eastman   attest that this documentation has been prepared under the direction and in the presence of Velasquez Talley MD    Provider Attestation - Scribe documentation  All medical record entries made by the Scribe were at my direction and personally dictated by me. I have reviewed the chart and agree that the record accurately reflects my personal performance of the history, physical exam, discussion and plan.   Velasquez Talley MD

## 2024-03-29 ENCOUNTER — NURSING HOME VISIT (OUTPATIENT)
Dept: POST ACUTE CARE | Facility: EXTERNAL LOCATION | Age: 89
End: 2024-03-29
Payer: COMMERCIAL

## 2024-03-29 DIAGNOSIS — I10 HYPERTENSION, ESSENTIAL: ICD-10-CM

## 2024-03-29 DIAGNOSIS — R62.7 FAILURE TO THRIVE IN ADULT: ICD-10-CM

## 2024-03-29 DIAGNOSIS — A49.8 CLOSTRIDIUM DIFFICILE INFECTION: Primary | ICD-10-CM

## 2024-03-29 PROCEDURE — 99308 SBSQ NF CARE LOW MDM 20: CPT | Performed by: INTERNAL MEDICINE

## 2024-03-29 NOTE — PROGRESS NOTES
PROGRESS NOTE    Subjective   Chief complaint: Liseth Briggs is a 89 y.o. female who is a long term care patient being seen and evaluated for C. difficile.    HPI:  HPI  Patient is seen in follow-up for C. difficile.  Patient remains on isolation precautions.  Patient continues on Vanco, tolerating without adverse effects.  Patient has no nausea vomiting or fevers.  Patient's BMs are more formed and less frequent.  Patient does continue on hospice for comfort care measures.  Patient appears comfortable on examination.    Objective   Vital signs: 119/71, 97.0, 80, 18, 95%    Physical Exam  Constitutional:       General: She is not in acute distress.  Eyes:      Extraocular Movements: Extraocular movements intact.   Pulmonary:      Effort: Pulmonary effort is normal.   Musculoskeletal:      Cervical back: Neck supple.   Skin:     Comments: Sacral dressing clean dry and intact   Neurological:      Mental Status: She is alert.   Psychiatric:         Mood and Affect: Mood normal.         Behavior: Behavior is cooperative.         Assessment/Plan   Problem List Items Addressed This Visit       Hypertension, essential     Losartan potassium  Metoprolol  Monitor blood pressure  AMIE diet  BP controlled         Failure to thrive in adult     Hospice for comfort care measures          Clostridium difficile infection - Primary     Vanco until complete.  4/4/2024  Isolation precautions          Medications, treatments, and labs reviewed  Continue medications and treatments as listed in EMR    Scribe Attestation  I, Aisha Eastman   attest that this documentation has been prepared under the direction and in the presence of Velasquez Talley MD    Provider Attestation - Scribe documentation  All medical record entries made by the Scribe were at my direction and personally dictated by me. I have reviewed the chart and agree that the record accurately reflects my personal performance of the history, physical exam, discussion  and plan.   Velasquez Talley MD

## 2024-03-29 NOTE — LETTER
Patient: Liseth Briggs  : 1920    Encounter Date: 2024    PROGRESS NOTE    Subjective  Chief complaint: Liseth Briggs is a 89 y.o. female who is a long term care patient being seen and evaluated for C. difficile.    HPI:  HPI  Patient is seen in follow-up for C. difficile.  Patient remains on isolation precautions.  Patient continues on Vanco, tolerating without adverse effects.  Patient has no nausea vomiting or fevers.  Patient's BMs are more formed and less frequent.  Patient does continue on hospice for comfort care measures.  Patient appears comfortable on examination.    Objective  Vital signs: 119/71, 97.0, 80, 18, 95%    Physical Exam  Constitutional:       General: She is not in acute distress.  Eyes:      Extraocular Movements: Extraocular movements intact.   Pulmonary:      Effort: Pulmonary effort is normal.   Musculoskeletal:      Cervical back: Neck supple.   Skin:     Comments: Sacral dressing clean dry and intact   Neurological:      Mental Status: She is alert.   Psychiatric:         Mood and Affect: Mood normal.         Behavior: Behavior is cooperative.         Assessment/Plan  Problem List Items Addressed This Visit       Hypertension, essential     Losartan potassium  Metoprolol  Monitor blood pressure  AMIE diet  BP controlled         Failure to thrive in adult     Hospice for comfort care measures          Clostridium difficile infection - Primary     Vanco until complete.  2024  Isolation precautions          Medications, treatments, and labs reviewed  Continue medications and treatments as listed in EMR    Scribe Attestation  ITonya Scribe   attest that this documentation has been prepared under the direction and in the presence of Velasquez Talley MD    Provider Attestation - Scribe documentation  All medical record entries made by the Scribe were at my direction and personally dictated by me. I have reviewed the chart and agree that the record accurately reflects  my personal performance of the history, physical exam, discussion and plan.   Velasquez Talley MD            Electronically Signed By: Velasquez Talley MD   3/29/24  6:13 PM

## 2024-04-01 PROBLEM — E46 MALNUTRITION (MULTI): Status: ACTIVE | Noted: 2024-04-01

## 2024-04-01 PROBLEM — N32.81 OVERACTIVE BLADDER: Status: ACTIVE | Noted: 2024-04-01

## 2024-04-01 PROBLEM — L89.153: Status: ACTIVE | Noted: 2024-02-19

## 2024-04-01 NOTE — ASSESSMENT & PLAN NOTE
Wound now stage 3    Patient is on hospice care  She is of advanced age 89 years old  Patient has multiple comorbidities including cerebrovascular disease, dementia, hypertension, polyneuropathy, glaucoma, depression, overactive bladder  She is incontinent  Patient has had a gradual weight loss over the past few years.  She is followed by the dietitian and nutritional supplements.  Weight is monitored.  Patient requires wheelchair for mobility.  Patient is to be in Broda chair when out of bed for comfort positioning and pressure reduction.  She has an air mattress in place, is on turn schedule, and has pressure reducing cushion to chair    TX: Cleanse open area with normal saline, cover with calcium alginate AG, foam daily and as neede

## 2024-04-01 NOTE — PROGRESS NOTES
PROGRESS NOTE    Subjective   Chief complaint: Liseth Briggs is a 89 y.o. female who is a long term care patient being seen and evaluated for follow-up.    HPI:  HPI  Patient is on hospice for comfort care measures.  Mini nutrition eval was completed and shows patient is malnourished.  Patient is followed by the dietician.  Denies n/v/f/c abdominal pain.  Patient appears comfortable, seen and examined at the bedside.    Objective   Vital signs: 119/71, 97.0, 80, 18, 95%    Physical Exam  Constitutional:       General: She is not in acute distress.  HENT:      Ears:      Comments: Hard of hearing  Eyes:      Extraocular Movements: Extraocular movements intact.      Comments: Blind   Pulmonary:      Effort: Pulmonary effort is normal.   Musculoskeletal:      Cervical back: Neck supple.   Neurological:      Mental Status: She is alert.   Psychiatric:         Mood and Affect: Mood normal.         Behavior: Behavior is cooperative.         Assessment/Plan   Problem List Items Addressed This Visit       Severe vascular dementia with agitation (CMS/HCC)     Secured unit for safety  Assist with care         Failure to thrive in adult     Hospice for comfort care measures          Malnutrition (CMS/HCC) - Primary     Dietitian following  Monitor weights          Medications, treatments, and labs reviewed  Continue medications and treatments as listed in EMR    Scribe Attestation  ITonya Scribe   attest that this documentation has been prepared under the direction and in the presence of PETRA Timmons    Provider Attestation - Scribe documentation  All medical record entries made by the Scribe were at my direction and personally dictated by me. I have reviewed the chart and agree that the record accurately reflects my personal performance of the history, physical exam, discussion and plan.   PETRA Timmons

## 2024-04-01 NOTE — PROGRESS NOTES
PROGRESS NOTE    Subjective   Chief complaint: Liseth Briggs is a 89 y.o. female who is a long term care patient being seen and evaluated for follow-up.    HPI:  HPI  Patient does continue on hospice for comfort care measures.  Asked to see patient to evaluate for continued use of oxybutynin.  Patient also with sacral ulcer and presents for fu.  Continues with dressing changes.    Objective   Vital signs: 119/71, 97.0, 80, 18, 95%    Physical Exam  Constitutional:       General: She is not in acute distress.  Eyes:      Extraocular Movements: Extraocular movements intact.   Pulmonary:      Effort: Pulmonary effort is normal.   Musculoskeletal:      Cervical back: Neck supple.   Skin:     Comments: Wound location -sacrum  Etiology - pressure   Granulation - large  Slough - small  Edges - flat  Odor - none  Drainage - Medium serosanguineous  Size - 0.5 X 0.5 X 0.3 cm  Undermining -none      Neurological:      Mental Status: She is alert.   Psychiatric:         Mood and Affect: Mood normal.         Behavior: Behavior is cooperative.         Assessment/Plan   Problem List Items Addressed This Visit       Failure to thrive in adult     Hospice for comfort care measures          Overactive bladder     Discontinue oxybutynin         Stage III pressure ulcer of sacral region (CMS/HCC) - Primary     Wound now stage 3    Patient is on hospice care  She is of advanced age 89 years old  Patient has multiple comorbidities including cerebrovascular disease, dementia, hypertension, polyneuropathy, glaucoma, depression, overactive bladder  She is incontinent  Patient has had a gradual weight loss over the past few years.  She is followed by the dietitian and nutritional supplements.  Weight is monitored.  Patient requires wheelchair for mobility.  Patient is to be in Broda chair when out of bed for comfort positioning and pressure reduction.  She has an air mattress in place, is on turn schedule, and has pressure reducing cushion  to chair    TX: Cleanse open area with normal saline, cover with calcium alginate AG, foam daily and as neede          Medications, treatments, and labs reviewed  Continue medications and treatments as listed in EMR    Scribe Attestation  I, Aisha Eastman   attest that this documentation has been prepared under the direction and in the presence of PETRA Timmons    Provider Attestation - Scribe documentation  All medical record entries made by the Scribe were at my direction and personally dictated by me. I have reviewed the chart and agree that the record accurately reflects my personal performance of the history, physical exam, discussion and plan.   PETRA Timmons

## 2024-04-03 ENCOUNTER — NURSING HOME VISIT (OUTPATIENT)
Dept: POST ACUTE CARE | Facility: EXTERNAL LOCATION | Age: 89
End: 2024-04-03
Payer: COMMERCIAL

## 2024-04-03 DIAGNOSIS — R62.7 FAILURE TO THRIVE IN ADULT: ICD-10-CM

## 2024-04-03 DIAGNOSIS — F01.C11 SEVERE VASCULAR DEMENTIA WITH AGITATION (MULTI): ICD-10-CM

## 2024-04-03 DIAGNOSIS — I10 HYPERTENSION, ESSENTIAL: ICD-10-CM

## 2024-04-03 DIAGNOSIS — A49.8 CLOSTRIDIUM DIFFICILE INFECTION: Primary | ICD-10-CM

## 2024-04-03 PROCEDURE — 99308 SBSQ NF CARE LOW MDM 20: CPT | Performed by: INTERNAL MEDICINE

## 2024-04-03 NOTE — LETTER
Patient: Liseth Briggs  : 1920    Encounter Date: 2024    PROGRESS NOTE    Subjective  Chief complaint: Liseth Briggs is a 89 y.o. female who is a long term care patient being seen and evaluated for C. difficile.    HPI:  HPI  Patient is on hospice for comfort care measures.  Patient is seen in follow-up for C. difficile.  Patient remains on isolation precautions and continues on Vanco, tolerating without adverse effects.  Reported that patient's diarrhea has improved.  No nausea or vomiting.    Objective  Vital signs: 119/71, 97.0, 80, 18, 95%    Physical Exam  Constitutional:       General: She is not in acute distress.  Eyes:      Extraocular Movements: Extraocular movements intact.   Pulmonary:      Effort: Pulmonary effort is normal.   Musculoskeletal:      Cervical back: Neck supple.   Skin:     Comments: Sacral dressing clean dry and intact   Neurological:      Mental Status: She is alert.   Psychiatric:         Mood and Affect: Mood normal.         Behavior: Behavior is cooperative.         Assessment/Plan  Problem List Items Addressed This Visit       Severe vascular dementia with agitation (CMS/[facility])     Secured unit for safety  Assist with care         Hypertension, essential     Losartan potassium  Metoprolol  Monitor blood pressure  AMIE diet  BP controlled         Failure to thrive in adult     Hospice for comfort care measures          Clostridium difficile infection - Primary     Vanco until complete.  2024  Isolation precautions          Medications, treatments, and labs reviewed  Continue medications and treatments as listed in EMR    Scribe Attestation  I, Aisha Eastman   attest that this documentation has been prepared under the direction and in the presence of Velasquez Talley MD    Provider Attestation - Scribe documentation  All medical record entries made by the Scribe were at my direction and personally dictated by me. I have reviewed the chart and agree that the record  accurately reflects my personal performance of the history, physical exam, discussion and plan.   Velasquez Talley MD            Electronically Signed By: Velasquez Talley MD   4/4/24  1:53 PM

## 2024-04-04 NOTE — PROGRESS NOTES
PROGRESS NOTE    Subjective   Chief complaint: Liseth Briggs is a 89 y.o. female who is a long term care patient being seen and evaluated for C. difficile.    HPI:  HPI  Patient is on hospice for comfort care measures.  Patient is seen in follow-up for C. difficile.  Patient remains on isolation precautions and continues on Vanco, tolerating without adverse effects.  Reported that patient's diarrhea has improved.  No nausea or vomiting.    Objective   Vital signs: 119/71, 97.0, 80, 18, 95%    Physical Exam  Constitutional:       General: She is not in acute distress.  Eyes:      Extraocular Movements: Extraocular movements intact.   Pulmonary:      Effort: Pulmonary effort is normal.   Musculoskeletal:      Cervical back: Neck supple.   Skin:     Comments: Sacral dressing clean dry and intact   Neurological:      Mental Status: She is alert.   Psychiatric:         Mood and Affect: Mood normal.         Behavior: Behavior is cooperative.         Assessment/Plan   Problem List Items Addressed This Visit       Severe vascular dementia with agitation (CMS/[facility])     Secured unit for safety  Assist with care         Hypertension, essential     Losartan potassium  Metoprolol  Monitor blood pressure  AMIE diet  BP controlled         Failure to thrive in adult     Hospice for comfort care measures          Clostridium difficile infection - Primary     Vanco until complete.  4/4/2024  Isolation precautions          Medications, treatments, and labs reviewed  Continue medications and treatments as listed in EMR    Scribe Attestation  I, Aisha Eastman   attest that this documentation has been prepared under the direction and in the presence of Velasquez Talley MD    Provider Attestation - Scribe documentation  All medical record entries made by the Scribe were at my direction and personally dictated by me. I have reviewed the chart and agree that the record accurately reflects my personal performance of the history,  physical exam, discussion and plan.   Velasquez Talley MD

## 2024-04-05 ENCOUNTER — NURSING HOME VISIT (OUTPATIENT)
Dept: POST ACUTE CARE | Facility: EXTERNAL LOCATION | Age: 89
End: 2024-04-05
Payer: COMMERCIAL

## 2024-04-05 DIAGNOSIS — F01.C11 SEVERE VASCULAR DEMENTIA WITH AGITATION (MULTI): ICD-10-CM

## 2024-04-05 DIAGNOSIS — R62.7 FAILURE TO THRIVE IN ADULT: ICD-10-CM

## 2024-04-05 DIAGNOSIS — A49.8 CLOSTRIDIUM DIFFICILE INFECTION: Primary | ICD-10-CM

## 2024-04-05 PROCEDURE — 99308 SBSQ NF CARE LOW MDM 20: CPT | Performed by: INTERNAL MEDICINE

## 2024-04-05 NOTE — PROGRESS NOTES
PROGRESS NOTE    Subjective   Chief complaint: Liseth Briggs is a 89 y.o. female who is a long term care patient being seen and evaluated for follow-up.    HPI:  HPI    Patient is seen in follow-up of C. difficile.  Patient is out of isolation precautions.  Patient did complete Vanco tolerated without adverse effects.  Patient is on hospice for comfort care measures.  Patient's diarrhea has improved.  Patient afebrile.    Objective   Vital signs: 119/71, 97.0, 80, 18, 95%    Physical Exam  Constitutional:       General: She is not in acute distress.  Eyes:      Extraocular Movements: Extraocular movements intact.   Pulmonary:      Effort: Pulmonary effort is normal.   Musculoskeletal:      Cervical back: Neck supple.   Skin:     Comments: Sacral dressing clean dry and intact   Neurological:      Mental Status: She is alert.   Psychiatric:         Mood and Affect: Mood normal.         Behavior: Behavior is cooperative.         Assessment/Plan   Problem List Items Addressed This Visit       Severe vascular dementia with agitation (CMS/[facility])     Secured unit for safety  Assist with care         Failure to thrive in adult     Hospice for comfort care measures          Clostridium difficile infection - Primary     Completed antibiotics  Out of isolation  Improved          Medications, treatments, and labs reviewed  Continue medications and treatments as listed in EMR    Scribe Attestation  I, Aisha Eastman   attest that this documentation has been prepared under the direction and in the presence of Velasquez Talley MD    Provider Attestation - Scribe documentation  All medical record entries made by the Scribe were at my direction and personally dictated by me. I have reviewed the chart and agree that the record accurately reflects my personal performance of the history, physical exam, discussion and plan.   Velasquez Talley MD

## 2024-04-05 NOTE — LETTER
Patient: Liseth Briggs  : 1920    Encounter Date: 2024    PROGRESS NOTE    Subjective  Chief complaint: Liseth Briggs is a 89 y.o. female who is a long term care patient being seen and evaluated for follow-up.    HPI:  HPI    Patient is seen in follow-up of C. difficile.  Patient is out of isolation precautions.  Patient did complete Vanco tolerated without adverse effects.  Patient is on hospice for comfort care measures.  Patient's diarrhea has improved.  Patient afebrile.    Objective  Vital signs: 119/71, 97.0, 80, 18, 95%    Physical Exam  Constitutional:       General: She is not in acute distress.  Eyes:      Extraocular Movements: Extraocular movements intact.   Pulmonary:      Effort: Pulmonary effort is normal.   Musculoskeletal:      Cervical back: Neck supple.   Skin:     Comments: Sacral dressing clean dry and intact   Neurological:      Mental Status: She is alert.   Psychiatric:         Mood and Affect: Mood normal.         Behavior: Behavior is cooperative.         Assessment/Plan  Problem List Items Addressed This Visit       Severe vascular dementia with agitation (CMS/[facility])     Secured unit for safety  Assist with care         Failure to thrive in adult     Hospice for comfort care measures          Clostridium difficile infection - Primary     Completed antibiotics  Out of isolation  Improved          Medications, treatments, and labs reviewed  Continue medications and treatments as listed in EMR    Scribe Attestation  ITonya Scribe   attest that this documentation has been prepared under the direction and in the presence of Velasquez Talley MD    Provider Attestation - Scribe documentation  All medical record entries made by the Scribe were at my direction and personally dictated by me. I have reviewed the chart and agree that the record accurately reflects my personal performance of the history, physical exam, discussion and plan.   Velasquez Talley,  MD            Electronically Signed By: Velasquez Talley MD   4/5/24  2:33 PM

## 2024-04-09 ENCOUNTER — NURSING HOME VISIT (OUTPATIENT)
Dept: POST ACUTE CARE | Facility: EXTERNAL LOCATION | Age: 89
End: 2024-04-09
Payer: MEDICARE

## 2024-04-09 DIAGNOSIS — L89.153 STAGE III PRESSURE ULCER OF SACRAL REGION (MULTI): Primary | ICD-10-CM

## 2024-04-09 DIAGNOSIS — I10 HYPERTENSION, ESSENTIAL: ICD-10-CM

## 2024-04-09 DIAGNOSIS — R62.7 FAILURE TO THRIVE IN ADULT: ICD-10-CM

## 2024-04-09 PROCEDURE — 99308 SBSQ NF CARE LOW MDM 20: CPT | Performed by: NURSE PRACTITIONER

## 2024-04-09 NOTE — LETTER
Patient: Lsieth Briggs  : 1920    Encounter Date: 2024    PROGRESS NOTE    Subjective  Chief complaint: Liseth Briggs is a 89 y.o. female who is a long term care patient being seen and evaluated for f/u wound.    HPI:  HPI  Patient is seen in follow-up of wound, sacral stage III pressure ulcer.  Patient continues with treatment interventions in place as per the EMR.  Patient is on hospice for comfort care measures.    Objective  Vital signs: 112/74, 96.9, 69, 18, 95%    Physical Exam  Constitutional:       General: She is not in acute distress.  Eyes:      Extraocular Movements: Extraocular movements intact.   Pulmonary:      Effort: Pulmonary effort is normal.   Musculoskeletal:      Cervical back: Neck supple.   Skin:     Comments: Wound location -sacrum  Etiology - pressure   Epithelial tissue present  Size - 0 X 0 X 0 cm     Neurological:      Mental Status: She is alert.   Psychiatric:         Mood and Affect: Mood normal.         Behavior: Behavior is cooperative.         Assessment/Plan  Problem List Items Addressed This Visit       Failure to thrive in adult     Hospice for comfort care measures          Hypertension, essential     Losartan potassium  Metoprolol  Monitor blood pressure  AMIE diet  BP controlled         Stage III pressure ulcer of sacral region (Multi) - Primary      Wound healed discontinue treatment.  Continue to offload pressure          Medications, treatments, and labs reviewed  Continue medications and treatments as listed in EMR    Scribe Attestation  ITonya Scribe   attest that this documentation has been prepared under the direction and in the presence of TERRANCE Timmons-CNP    Provider Attestation - Scribe documentation  All medical record entries made by the Scribe were at my direction and personally dictated by me. I have reviewed the chart and agree that the record accurately reflects my personal performance of the history, physical exam, discussion  and plan.   PETRA Timmons            Electronically Signed By: PETRA Timmons   4/23/24  3:24 PM

## 2024-04-16 ENCOUNTER — NURSING HOME VISIT (OUTPATIENT)
Dept: POST ACUTE CARE | Facility: EXTERNAL LOCATION | Age: 89
End: 2024-04-16
Payer: MEDICARE

## 2024-04-16 DIAGNOSIS — R62.7 FAILURE TO THRIVE IN ADULT: ICD-10-CM

## 2024-04-16 DIAGNOSIS — I10 HYPERTENSION, ESSENTIAL: ICD-10-CM

## 2024-04-16 DIAGNOSIS — L89.153 STAGE III PRESSURE ULCER OF SACRAL REGION (MULTI): Primary | ICD-10-CM

## 2024-04-16 PROCEDURE — 99308 SBSQ NF CARE LOW MDM 20: CPT | Performed by: NURSE PRACTITIONER

## 2024-04-16 NOTE — ASSESSMENT & PLAN NOTE
Patient is on hospice care  She is of advanced age 89 years old  Patient has multiple comorbidities including cerebrovascular disease, dementia, hypertension, polyneuropathy, glaucoma, depression, overactive bladder  She is incontinent  Patient has had a gradual weight loss over the past few years.  She is followed by the dietitian and nutritional supplements.  Weight is monitored.  Patient requires wheelchair for mobility.  Patient is to be in Broda chair when out of bed for comfort positioning and pressure reduction.  She has an air mattress in place, is on turn schedule, and has pressure reducing cushion to chair    TX: Cleanse open area with normal saline, cover with calcium alginate AG, foam daily and as neede

## 2024-04-16 NOTE — LETTER
Patient: Liseth Briggs  : 1920    Encounter Date: 2024    PROGRESS NOTE    Subjective  Chief complaint: Liseth Briggs is a 89 y.o. female who is a long term care patient being seen and evaluated for f/u wound    HPI:  HPI  Patient is on hospice for comfort care measures.  Patient is seen in follow-up for wound to sacrum, pressure ulcer.  Patient seen and examined at bedside, appears to be in no acute distress.    Objective  Vital signs: 112/74, 96.9, 69, 18, 93%    Physical Exam  Constitutional:       General: She is not in acute distress.  Eyes:      Extraocular Movements: Extraocular movements intact.   Pulmonary:      Effort: Pulmonary effort is normal.   Musculoskeletal:      Cervical back: Neck supple.   Skin:     Comments: Wound location -sacrum  Etiology - pressure   Epithelial  Granulation - none  Slough - none  Edges - none  Odor - none  Drainage - none   size - 0 X 0 X 0 cm  Undermining -none      Neurological:      Mental Status: She is alert.   Psychiatric:         Mood and Affect: Mood normal.         Behavior: Behavior is cooperative.         Assessment/Plan  Problem List Items Addressed This Visit       Hypertension, essential     Losartan potassium  Metoprolol  Monitor blood pressure  AMIE diet  BP controlled         Failure to thrive in adult     Hospice for comfort care measures          Stage III pressure ulcer of sacral region (Multi) - Primary     Patient is on hospice care  She is of advanced age 89 years old  Patient has multiple comorbidities including cerebrovascular disease, dementia, hypertension, polyneuropathy, glaucoma, depression, overactive bladder  She is incontinent  Patient has had a gradual weight loss over the past few years.  She is followed by the dietitian and nutritional supplements.  Weight is monitored.  Patient requires wheelchair for mobility.  Patient is to be in Broda chair when out of bed for comfort positioning and pressure reduction.  She has an air  mattress in place, is on turn schedule, and has pressure reducing cushion to chair    TX: Cleanse open area with normal saline, cover with calcium alginate AG, foam daily and as neede          Medications, treatments, and labs reviewed  Continue medications and treatments as listed in EMR    Scribe Attestation  Tonya FORD Scribe   attest that this documentation has been prepared under the direction and in the presence of PETRA Timmons    Provider Attestation - Scribe documentation  All medical record entries made by the Scribe were at my direction and personally dictated by me. I have reviewed the chart and agree that the record accurately reflects my personal performance of the history, physical exam, discussion and plan.   PETRA Timmons            Electronically Signed By: PETRA Timmons   4/19/24  5:19 PM

## 2024-04-16 NOTE — PROGRESS NOTES
PROGRESS NOTE    Subjective   Chief complaint: Liseth Briggs is a 89 y.o. female who is a long term care patient being seen and evaluated for f/u wound    HPI:  HPI  Patient is on hospice for comfort care measures.  Patient is seen in follow-up for wound to sacrum, pressure ulcer.  Patient seen and examined at bedside, appears to be in no acute distress.    Objective   Vital signs: 112/74, 96.9, 69, 18, 93%    Physical Exam  Constitutional:       General: She is not in acute distress.  Eyes:      Extraocular Movements: Extraocular movements intact.   Pulmonary:      Effort: Pulmonary effort is normal.   Musculoskeletal:      Cervical back: Neck supple.   Skin:     Comments: Wound location -sacrum  Etiology - pressure   Epithelial  Granulation - none  Slough - none  Edges - none  Odor - none  Drainage - none   size - 0 X 0 X 0 cm  Undermining -none      Neurological:      Mental Status: She is alert.   Psychiatric:         Mood and Affect: Mood normal.         Behavior: Behavior is cooperative.         Assessment/Plan   Problem List Items Addressed This Visit       Hypertension, essential     Losartan potassium  Metoprolol  Monitor blood pressure  AMIE diet  BP controlled         Failure to thrive in adult     Hospice for comfort care measures          Stage III pressure ulcer of sacral region (Multi) - Primary     Patient is on hospice care  She is of advanced age 89 years old  Patient has multiple comorbidities including cerebrovascular disease, dementia, hypertension, polyneuropathy, glaucoma, depression, overactive bladder  She is incontinent  Patient has had a gradual weight loss over the past few years.  She is followed by the dietitian and nutritional supplements.  Weight is monitored.  Patient requires wheelchair for mobility.  Patient is to be in Broda chair when out of bed for comfort positioning and pressure reduction.  She has an air mattress in place, is on turn schedule, and has pressure reducing cushion  to chair    TX: Cleanse open area with normal saline, cover with calcium alginate AG, foam daily and as neede          Medications, treatments, and labs reviewed  Continue medications and treatments as listed in EMR    Scribe Attestation  I, Aisha Eastman   attest that this documentation has been prepared under the direction and in the presence of PETRA Timmons    Provider Attestation - Scribe documentation  All medical record entries made by the Scribe were at my direction and personally dictated by me. I have reviewed the chart and agree that the record accurately reflects my personal performance of the history, physical exam, discussion and plan.   PETRA Timmons

## 2024-04-22 NOTE — PROGRESS NOTES
PROGRESS NOTE    Subjective   Chief complaint: Liseth Briggs is a 89 y.o. female who is a long term care patient being seen and evaluated for f/u wound.    HPI:  HPI  Patient is seen in follow-up of wound, sacral stage III pressure ulcer.  Patient continues with treatment interventions in place as per the EMR.  Patient is on hospice for comfort care measures.    Objective   Vital signs: 112/74, 96.9, 69, 18, 95%    Physical Exam  Constitutional:       General: She is not in acute distress.  Eyes:      Extraocular Movements: Extraocular movements intact.   Pulmonary:      Effort: Pulmonary effort is normal.   Musculoskeletal:      Cervical back: Neck supple.   Skin:     Comments: Wound location -sacrum  Etiology - pressure   Epithelial tissue present  Size - 0 X 0 X 0 cm     Neurological:      Mental Status: She is alert.   Psychiatric:         Mood and Affect: Mood normal.         Behavior: Behavior is cooperative.         Assessment/Plan   Problem List Items Addressed This Visit       Failure to thrive in adult     Hospice for comfort care measures          Hypertension, essential     Losartan potassium  Metoprolol  Monitor blood pressure  AMIE diet  BP controlled         Stage III pressure ulcer of sacral region (Multi) - Primary      Wound healed discontinue treatment.  Continue to offload pressure          Medications, treatments, and labs reviewed  Continue medications and treatments as listed in EMR    Scribe Attestation  Tonya FORD Scribe   attest that this documentation has been prepared under the direction and in the presence of PETRA Timmons    Provider Attestation - Scribe documentation  All medical record entries made by the Scribe were at my direction and personally dictated by me. I have reviewed the chart and agree that the record accurately reflects my personal performance of the history, physical exam, discussion and plan.   PETRA Timmons

## 2024-04-23 ENCOUNTER — NURSING HOME VISIT (OUTPATIENT)
Dept: POST ACUTE CARE | Facility: EXTERNAL LOCATION | Age: 89
End: 2024-04-23
Payer: MEDICARE

## 2024-04-23 DIAGNOSIS — F41.8 DEPRESSION WITH ANXIETY: Primary | ICD-10-CM

## 2024-04-23 DIAGNOSIS — F01.C11 SEVERE VASCULAR DEMENTIA WITH AGITATION (MULTI): ICD-10-CM

## 2024-04-23 DIAGNOSIS — I10 HYPERTENSION, ESSENTIAL: ICD-10-CM

## 2024-04-23 DIAGNOSIS — R62.7 FAILURE TO THRIVE IN ADULT: ICD-10-CM

## 2024-04-23 PROCEDURE — 99309 SBSQ NF CARE MODERATE MDM 30: CPT | Performed by: NURSE PRACTITIONER

## 2024-04-23 NOTE — LETTER
Patient: Liseth Briggs  : 1920    Encounter Date: 2024    PROGRESS NOTE    Subjective  Chief complaint: Liseth Briggs is a 89 y.o. female who is a long term care patient being seen and evaluated for GDR.    HPI:  HPI  Patient is on hospice for comfort care measures.  Asked to evaluate patient's continued use of Depakote, Zoloft and Ativan.  Patient does have diagnosis of dementia with agitation, anxiety and depression.  Patient is tolerating medications per nursing.  Patient's symptoms are currently controlled.  Patient denies feeling anxious or down.  Denies thoughts of harming self or others.  Ddenies feeling anxious nervous or afraid.    Objective  Vital signs: 125/62, 97.6, 77, 20, 96%    Physical Exam  Constitutional:       General: She is not in acute distress.  Eyes:      Extraocular Movements: Extraocular movements intact.   Pulmonary:      Effort: Pulmonary effort is normal.   Musculoskeletal:      Cervical back: Neck supple.   Neurological:      Mental Status: She is alert.   Psychiatric:         Mood and Affect: Mood normal.         Behavior: Behavior is cooperative.         Assessment/Plan  Problem List Items Addressed This Visit       Depression with anxiety - Primary     Gradual dose reduction clinically contraindicated at this time.  Continued use is within current practice guidelines and medication reduction may cause return or worsening of symptoms.  Patient is stable and without side effects of therapy and these continue to be monitored.    Continue Depakote and Zoloft as currently ordered  Continue as needed Ativan for 6 more months         Failure to thrive in adult     Hospice for comfort care measures          Hypertension, essential     Blood pressure at goal  Losartan potassium  Metoprolol  Monitor blood pressure  AMIE diet         Severe vascular dementia with agitation (Multi)     Secured unit for safety          Medications, treatments, and labs reviewed  Continue medications and  treatments as listed in EMR    Scribe Attestation  ITonya Scribe   attest that this documentation has been prepared under the direction and in the presence of PETRA Timmons    Provider Attestation - Scribe documentation  All medical record entries made by the Scribe were at my direction and personally dictated by me. I have reviewed the chart and agree that the record accurately reflects my personal performance of the history, physical exam, discussion and plan.   PETRA Timmons            Electronically Signed By: PETRA Timmons   5/3/24  1:54 PM

## 2024-04-24 ENCOUNTER — NURSING HOME VISIT (OUTPATIENT)
Dept: POST ACUTE CARE | Facility: EXTERNAL LOCATION | Age: 89
End: 2024-04-24
Payer: COMMERCIAL

## 2024-04-24 DIAGNOSIS — R62.7 FAILURE TO THRIVE IN ADULT: ICD-10-CM

## 2024-04-24 DIAGNOSIS — F41.8 DEPRESSION WITH ANXIETY: ICD-10-CM

## 2024-04-24 DIAGNOSIS — F01.C11 SEVERE VASCULAR DEMENTIA WITH AGITATION (MULTI): Primary | ICD-10-CM

## 2024-04-24 DIAGNOSIS — I10 HYPERTENSION, ESSENTIAL: ICD-10-CM

## 2024-04-24 PROCEDURE — 99309 SBSQ NF CARE MODERATE MDM 30: CPT | Performed by: INTERNAL MEDICINE

## 2024-04-24 NOTE — PROGRESS NOTES
PROGRESS NOTE    Subjective   Chief complaint: Liseth Briggs is a 89 y.o. female who is a long term care patient being seen and evaluated for monthly general medical care and follow-up    HPI:  HPI  Patient presents for general medical care and f/u.  Patient seen and examined at bedside.  No issues per nursing.  Patient has no acute complaints.  Patient is on hospice for comfort care measures.  Patient has dementia and requires assist with ADLs.  Patient with diagnosis of depression.  Mood is stable.  Denies feeling down and thoughts of harming self or others.  HTN BP at goal.  Denies chest pain and headache.  Mentation at baseline, no acute distress    Objective   Vital signs: 125/62, 97.6, 77, 20, 96%    Physical Exam  Constitutional:       General: She is not in acute distress.  Eyes:      Extraocular Movements: Extraocular movements intact.   Pulmonary:      Effort: Pulmonary effort is normal.   Musculoskeletal:      Cervical back: Neck supple.   Neurological:      Mental Status: She is alert.   Psychiatric:         Mood and Affect: Mood normal.         Behavior: Behavior is cooperative.         Assessment/Plan   Problem List Items Addressed This Visit       Severe vascular dementia with agitation (Multi) - Primary     Secured unit for safety  Assist with care         Hypertension, essential     Losartan potassium  Metoprolol  Monitor blood pressure  AMIE diet  BP at goal         Depression with anxiety      Zoloft   Depakote  continue to monitor         Failure to thrive in adult     Hospice for comfort care measures           Medications, treatments, and labs reviewed  Continue medications and treatments as listed in EMR    Scribe Attestation  I, Aisha Eastman   attest that this documentation has been prepared under the direction and in the presence of Velasquez Talley MD    Provider Attestation - Scribe documentation  All medical record entries made by the Scribe were at my direction and personally  dictated by me. I have reviewed the chart and agree that the record accurately reflects my personal performance of the history, physical exam, discussion and plan.   Velasquez Talley MD

## 2024-04-24 NOTE — LETTER
Patient: Liseth Briggs  : 1920    Encounter Date: 2024    PROGRESS NOTE    Subjective  Chief complaint: Liseth Briggs is a 89 y.o. female who is a long term care patient being seen and evaluated for monthly general medical care and follow-up    HPI:  HPI  Patient presents for general medical care and f/u.  Patient seen and examined at bedside.  No issues per nursing.  Patient has no acute complaints.  Patient is on hospice for comfort care measures.  Patient has dementia and requires assist with ADLs.  Patient with diagnosis of depression.  Mood is stable.  Denies feeling down and thoughts of harming self or others.  HTN BP at goal.  Denies chest pain and headache.  Mentation at baseline, no acute distress    Objective  Vital signs: 125/62, 97.6, 77, 20, 96%    Physical Exam  Constitutional:       General: She is not in acute distress.  Eyes:      Extraocular Movements: Extraocular movements intact.   Pulmonary:      Effort: Pulmonary effort is normal.   Musculoskeletal:      Cervical back: Neck supple.   Neurological:      Mental Status: She is alert.   Psychiatric:         Mood and Affect: Mood normal.         Behavior: Behavior is cooperative.         Assessment/Plan  Problem List Items Addressed This Visit       Severe vascular dementia with agitation (Multi) - Primary     Secured unit for safety  Assist with care         Hypertension, essential     Losartan potassium  Metoprolol  Monitor blood pressure  AMIE diet  BP at goal         Depression with anxiety      Zoloft   Depakote  continue to monitor         Failure to thrive in adult     Hospice for comfort care measures           Medications, treatments, and labs reviewed  Continue medications and treatments as listed in EMR    Scribe Attestation  I, Aisha Eastman   attest that this documentation has been prepared under the direction and in the presence of Velasquez Talley MD    Provider Attestation - Scribe documentation  All medical record  entries made by the Scribe were at my direction and personally dictated by me. I have reviewed the chart and agree that the record accurately reflects my personal performance of the history, physical exam, discussion and plan.   Velasquez Talley MD            Electronically Signed By: Velasquez Talley MD   4/24/24  3:55 PM

## 2024-04-30 NOTE — PROGRESS NOTES
"              After Visit Summary   2017    Mane Chawla    MRN: [id number]           Patient Information     Date Of Birth          1953        Visit Information        Provider Department      2017 3:15 PM Johnnie Dejesus MD [facility]        Today's Diagnoses     Chronic bilateral low back pain without sciatica           Follow-ups after your visit        Who to contact     If you have questions or need follow up information about today's clinic visit or your schedule please contact [facility] directly at [phone].  Normal or non-critical lab and imaging results will be communicated to you by [employer], letter or phone within 4 business days after the clinic has received the results. If you do not hear from us within 7 days, please contact the clinic through [employer] or phone. If you have a critical or abnormal lab result, we will notify you by phone as soon as possible.  Submit refill requests through [employer] or call your pharmacy and they will forward the refill request to us. Please allow 3 business days for your refill to be completed.          Additional Information About Your Visit        MyChart Information     [employer] lets you send messages to your doctor, view your test results, renew your prescriptions, schedule appointments and more. To sign up, go to [email or web address] . Click on \"Log in\" on the left side of the screen, which will take you to the Welcome page. Then click on \"Sign up Now\" on the right side of the page.     You will be asked to enter the access code listed below, as well as some personal information. Please follow the directions to create your username and password.     Your access code is: [email or web address]  Expires: 2017  4:09 PM     Your access code will  in 90 days. If you need help or a new code, please call your [facility] or [phone].        Care EveryWhere ID     This is your Care EveryWhere ID. This could be " PROGRESS NOTE    Subjective   Chief complaint: Liseth Briggs is a 89 y.o. female who is a long term care patient being seen and evaluated for GDR.    HPI:  HPI  Patient is on hospice for comfort care measures.  Asked to evaluate patient's continued use of Depakote, Zoloft and Ativan.  Patient does have diagnosis of dementia with agitation, anxiety and depression.  Patient is tolerating medications per nursing.  Patient's symptoms are currently controlled.  Patient denies feeling anxious or down.  Denies thoughts of harming self or others.  Ddenies feeling anxious nervous or afraid.    Objective   Vital signs: 125/62, 97.6, 77, 20, 96%    Physical Exam  Constitutional:       General: She is not in acute distress.  Eyes:      Extraocular Movements: Extraocular movements intact.   Pulmonary:      Effort: Pulmonary effort is normal.   Musculoskeletal:      Cervical back: Neck supple.   Neurological:      Mental Status: She is alert.   Psychiatric:         Mood and Affect: Mood normal.         Behavior: Behavior is cooperative.         Assessment/Plan   Problem List Items Addressed This Visit       Depression with anxiety - Primary     Gradual dose reduction clinically contraindicated at this time.  Continued use is within current practice guidelines and medication reduction may cause return or worsening of symptoms.  Patient is stable and without side effects of therapy and these continue to be monitored.    Continue Depakote and Zoloft as currently ordered  Continue as needed Ativan for 6 more months         Failure to thrive in adult     Hospice for comfort care measures          Hypertension, essential     Blood pressure at goal  Losartan potassium  Metoprolol  Monitor blood pressure  AMIE diet         Severe vascular dementia with agitation (Multi)     Secured unit for safety          Medications, treatments, and labs reviewed  Continue medications and treatments as listed in EMR    Scribe Attestation  Tonya FORD  "used by other organizations to access your [place] medical records  [id number]        Your Vitals Were     Pulse Height Pulse Oximetry BMI (Body Mass Index)          83 1.702 m (5' 7\") 98% 28.98 kg/m2         Blood Pressure from Last 3 Encounters:   04/27/17 132/80   03/06/17 142/76   11/22/16 142/82    Weight from Last 3 Encounters:   04/27/17 83.9 kg (185 lb)   03/06/17 86.2 kg (190 lb)   11/22/16 83 kg (183 lb)              Today, you had the following     No orders found for display         Where to get your medicines      Some of these will need a paper prescription and others can be bought over the counter.  Ask your nurse if you have questions.     Bring a paper prescription for each of these medications     oxyCODONE-acetaminophen 5-325 MG per tablet          Primary Care Provider Office Phone # Fax #    Johnnie Dejesus [phone]       [facility] [address] [facility] [address]        Thank you!     Thank you for choosing [facility]  for your care. Our goal is always to provide you with excellent care. Hearing back from our patients is one way we can continue to improve our services. Please take a few minutes to complete the written survey that you may receive in the mail after your visit with us. Thank you!             Your Updated Medication List - Protect others around you: Learn how to safely use, store and throw away your medicines at www.disposemymeds.org.          This list is accurate as of: 4/27/17  5:58 PM.  Always use your most recent med list.                   Brand Name Dispense Instructions for use    ADVIL PO          CIMETIDINE ACID REDUCER PO      Take 1 tablet by mouth every morning.       diazepam 10 MG tablet    VALIUM    30 tablet    TAKE 1 TABLET BY MOUTH NIGHTLY AS NEEDED FOR SLEEP OR MUSCLE SPASMS       ENBREL SURECLICK 50 MG/ML autoinjector   Generic drug:  Etanercept          fish oil-omega-3 fatty acids 1000 MG capsule      " Aisha Miles   attest that this documentation has been prepared under the direction and in the presence of PETRA Timmons    Provider Attestation - Scribe documentation  All medical record entries made by the Scribe were at my direction and personally dictated by me. I have reviewed the chart and agree that the record accurately reflects my personal performance of the history, physical exam, discussion and plan.   PETRA Timmons           Take 2 g by mouth daily       Multi-vitamin Tabs tablet      Take 1 tablet by mouth daily       oxyCODONE-acetaminophen 5-325 MG per tablet    PERCOCET    90 tablet    Take 1 tablet by mouth every 4 hours as needed for moderate to severe pain       UNABLE TO FIND      MEDICATION NAME: Tumeric          no

## 2024-04-30 NOTE — ASSESSMENT & PLAN NOTE
Gradual dose reduction clinically contraindicated at this time.  Continued use is within current practice guidelines and medication reduction may cause return or worsening of symptoms.  Patient is stable and without side effects of therapy and these continue to be monitored.    Continue Depakote and Zoloft as currently ordered  Continue as needed Ativan for 6 more months

## 2024-05-22 ENCOUNTER — NURSING HOME VISIT (OUTPATIENT)
Dept: POST ACUTE CARE | Facility: EXTERNAL LOCATION | Age: 89
End: 2024-05-22
Payer: COMMERCIAL

## 2024-05-22 DIAGNOSIS — R62.7 FAILURE TO THRIVE IN ADULT: ICD-10-CM

## 2024-05-22 DIAGNOSIS — F41.8 DEPRESSION WITH ANXIETY: ICD-10-CM

## 2024-05-22 DIAGNOSIS — F01.C11 SEVERE VASCULAR DEMENTIA WITH AGITATION (MULTI): Primary | ICD-10-CM

## 2024-05-22 DIAGNOSIS — I10 HYPERTENSION, ESSENTIAL: ICD-10-CM

## 2024-05-22 PROCEDURE — 99309 SBSQ NF CARE MODERATE MDM 30: CPT | Performed by: INTERNAL MEDICINE

## 2024-05-22 NOTE — PROGRESS NOTES
PROGRESS NOTE    Subjective   Chief complaint: Liseth Briggs is a 89 y.o. female who is a long term care patient being seen and evaluated for monthly general medical care and follow-up    HPI:  HPI  Patient presents for general medical care and f/u.  Patient seen and examined at bedside.  No issues per nursing.  Patient has no acute complaints.  Patient is on hospice for comfort care measures.  Patient has dementia and requires assist with ADLs.  Patient is on secure unit for safety.  HTN, Denies chest pain and headache.  Patient with diagnosis of depression.  Mood is stable.  Denies feeling down and thoughts of harming self or others.  Mentation at baseline, no acute distress.    Objective   Vital signs: 140/70, 98.2, 81, 18, 95%    Physical Exam  Constitutional:       General: She is not in acute distress.  Eyes:      Extraocular Movements: Extraocular movements intact.   Pulmonary:      Effort: Pulmonary effort is normal.   Musculoskeletal:      Cervical back: Neck supple.   Neurological:      Mental Status: She is alert.   Psychiatric:         Mood and Affect: Mood normal.         Behavior: Behavior is cooperative.         Assessment/Plan   Problem List Items Addressed This Visit       Severe vascular dementia with agitation (Multi) - Primary     Secured unit for safety  Assist with care         Hypertension, essential     Losartan potassium  Metoprolol  Monitor blood pressure  AMIE diet         Depression with anxiety      Zoloft   Depakote  As needed Ativan  continue to monitor         Failure to thrive in adult     Hospice for comfort care measures           Medications, treatments, and labs reviewed  Continue medications and treatments as listed in EMR    Scribe Attestation  I, Aisha Eastman   attest that this documentation has been prepared under the direction and in the presence of Velasquez Talley MD    Provider Attestation - Scribe documentation  All medical record entries made by the Scribe were  at my direction and personally dictated by me. I have reviewed the chart and agree that the record accurately reflects my personal performance of the history, physical exam, discussion and plan.   Velasquez Talley MD

## 2024-05-22 NOTE — LETTER
Patient: Liseth Briggs  : 1920    Encounter Date: 2024    PROGRESS NOTE    Subjective  Chief complaint: Liseth Briggs is a 89 y.o. female who is a long term care patient being seen and evaluated for monthly general medical care and follow-up    HPI:  HPI  Patient presents for general medical care and f/u.  Patient seen and examined at bedside.  No issues per nursing.  Patient has no acute complaints.  Patient is on hospice for comfort care measures.  Patient has dementia and requires assist with ADLs.  Patient is on secure unit for safety.  HTN, Denies chest pain and headache.  Patient with diagnosis of depression.  Mood is stable.  Denies feeling down and thoughts of harming self or others.  Mentation at baseline, no acute distress.    Objective  Vital signs: 140/70, 98.2, 81, 18, 95%    Physical Exam  Constitutional:       General: She is not in acute distress.  Eyes:      Extraocular Movements: Extraocular movements intact.   Pulmonary:      Effort: Pulmonary effort is normal.   Musculoskeletal:      Cervical back: Neck supple.   Neurological:      Mental Status: She is alert.   Psychiatric:         Mood and Affect: Mood normal.         Behavior: Behavior is cooperative.         Assessment/Plan  Problem List Items Addressed This Visit       Severe vascular dementia with agitation (Multi) - Primary     Secured unit for safety  Assist with care         Hypertension, essential     Losartan potassium  Metoprolol  Monitor blood pressure  AMIE diet         Depression with anxiety      Zoloft   Depakote  As needed Ativan  continue to monitor         Failure to thrive in adult     Hospice for comfort care measures           Medications, treatments, and labs reviewed  Continue medications and treatments as listed in EMR    Scribe Attestation  Tonya FORD Scribe   attest that this documentation has been prepared under the direction and in the presence of Velasquez Talley MD    Provider Attestation - Scribe  documentation  All medical record entries made by the Scribe were at my direction and personally dictated by me. I have reviewed the chart and agree that the record accurately reflects my personal performance of the history, physical exam, discussion and plan.   Velasquez Talley MD            Electronically Signed By: Velasquez Talley MD   5/22/24  1:59 PM

## 2024-06-26 ENCOUNTER — NURSING HOME VISIT (OUTPATIENT)
Dept: POST ACUTE CARE | Facility: EXTERNAL LOCATION | Age: 89
End: 2024-06-26
Payer: COMMERCIAL

## 2024-06-26 DIAGNOSIS — F01.C11 SEVERE VASCULAR DEMENTIA WITH AGITATION (MULTI): ICD-10-CM

## 2024-06-26 DIAGNOSIS — R62.7 FAILURE TO THRIVE IN ADULT: Primary | ICD-10-CM

## 2024-06-26 DIAGNOSIS — F41.8 DEPRESSION WITH ANXIETY: ICD-10-CM

## 2024-06-26 DIAGNOSIS — I10 HYPERTENSION, ESSENTIAL: ICD-10-CM

## 2024-06-26 PROCEDURE — 99309 SBSQ NF CARE MODERATE MDM 30: CPT | Performed by: INTERNAL MEDICINE

## 2024-06-26 NOTE — LETTER
Patient: Liseth Briggs  : 1920    Encounter Date: 2024    PROGRESS NOTE    Subjective  Chief complaint: Liseth Briggs is a 89 y.o. female who is a long term care patient being seen and evaluated for monthly general medical care and follow-up    HPI:  HPI  Patient presents for general medical care and f/u.  Patient seen and examined at bedside.  No issues per nursing.  Patient has no acute complaints.  Patient is a hospice for comfort care measures.  Patient has dementia and requires assist with ADLs.  Patient resides on secured unit for safety.  Patient with diagnosis of depression.  Mood is stable.  Denies feeling down and thoughts of harming self or others.  HTN, Denies chest pain and headache.  Mentation at baseline, no acute distress.    Objective  Vital signs: 145/76, 98.7, 75, 18, 95%    Physical Exam  Constitutional:       General: She is not in acute distress.  Eyes:      Extraocular Movements: Extraocular movements intact.   Pulmonary:      Effort: Pulmonary effort is normal.   Musculoskeletal:      Cervical back: Neck supple.   Neurological:      Mental Status: She is alert.   Psychiatric:         Mood and Affect: Mood normal.         Behavior: Behavior is cooperative.         Assessment/Plan  Problem List Items Addressed This Visit       Severe vascular dementia with agitation (Multi)     Secured unit for safety  Assist with care         Hypertension, essential     Losartan potassium  Metoprolol  Monitor blood pressure  AMIE diet         Depression with anxiety      Zoloft   Depakote  As needed Ativan  continue to monitor         Failure to thrive in adult - Primary     Hospice for comfort care measures           Medications, treatments, and labs reviewed  Continue medications and treatments as listed in EMR    Scribe Attestation  I, Aisha Eastman   attest that this documentation has been prepared under the direction and in the presence of Velasquez Talley MD    Provider Attestation -  Scribe documentation  All medical record entries made by the Scribe were at my direction and personally dictated by me. I have reviewed the chart and agree that the record accurately reflects my personal performance of the history, physical exam, discussion and plan.   Velasquez Talley MD            Electronically Signed By: Velasquez Talley MD   6/26/24  3:05 PM

## 2024-06-26 NOTE — PROGRESS NOTES
PROGRESS NOTE    Subjective   Chief complaint: Liseth Briggs is a 89 y.o. female who is a long term care patient being seen and evaluated for monthly general medical care and follow-up    HPI:  HPI  Patient presents for general medical care and f/u.  Patient seen and examined at bedside.  No issues per nursing.  Patient has no acute complaints.  Patient is a hospice for comfort care measures.  Patient has dementia and requires assist with ADLs.  Patient resides on secured unit for safety.  Patient with diagnosis of depression.  Mood is stable.  Denies feeling down and thoughts of harming self or others.  HTN, Denies chest pain and headache.  Mentation at baseline, no acute distress.    Objective   Vital signs: 145/76, 98.7, 75, 18, 95%    Physical Exam  Constitutional:       General: She is not in acute distress.  Eyes:      Extraocular Movements: Extraocular movements intact.   Pulmonary:      Effort: Pulmonary effort is normal.   Musculoskeletal:      Cervical back: Neck supple.   Neurological:      Mental Status: She is alert.   Psychiatric:         Mood and Affect: Mood normal.         Behavior: Behavior is cooperative.         Assessment/Plan   Problem List Items Addressed This Visit       Severe vascular dementia with agitation (Multi)     Secured unit for safety  Assist with care         Hypertension, essential     Losartan potassium  Metoprolol  Monitor blood pressure  AMIE diet         Depression with anxiety      Zoloft   Depakote  As needed Ativan  continue to monitor         Failure to thrive in adult - Primary     Hospice for comfort care measures           Medications, treatments, and labs reviewed  Continue medications and treatments as listed in EMR    Scribe Attestation  I, Aisha Eastman   attest that this documentation has been prepared under the direction and in the presence of Velasquez Talley MD    Provider Attestation - Scribe documentation  All medical record entries made by the Scribe  were at my direction and personally dictated by me. I have reviewed the chart and agree that the record accurately reflects my personal performance of the history, physical exam, discussion and plan.   Velasquez Talley MD

## 2024-06-27 ENCOUNTER — NURSING HOME VISIT (OUTPATIENT)
Dept: POST ACUTE CARE | Facility: EXTERNAL LOCATION | Age: 89
End: 2024-06-27
Payer: COMMERCIAL

## 2024-06-27 DIAGNOSIS — F01.C11 SEVERE VASCULAR DEMENTIA WITH AGITATION (MULTI): ICD-10-CM

## 2024-06-27 DIAGNOSIS — F41.8 DEPRESSION WITH ANXIETY: Primary | ICD-10-CM

## 2024-06-27 DIAGNOSIS — R62.7 FAILURE TO THRIVE IN ADULT: ICD-10-CM

## 2024-06-27 PROCEDURE — 99308 SBSQ NF CARE LOW MDM 20: CPT | Performed by: NURSE PRACTITIONER

## 2024-06-27 NOTE — LETTER
Patient: Liseth Briggs  : 1920    Encounter Date: 2024    PROGRESS NOTE    Subjective  Chief complaint: Liseth Briggs is a 89 y.o. female who is a long term care patient being seen and evaluated for anxiety.    HPI:  HPI  Patient is on hospice for comfort care measures.  Nursing called yesterday reported that patient has a GDR for Ativan for diagnosis of anxiety.  Orders placed to discontinue due to symptoms controlled.  Continue to monitor.    Objective  Vital signs: 136/78, 97.5, 61, 18, 96%    Physical Exam  Constitutional:       General: She is not in acute distress.  Eyes:      Extraocular Movements: Extraocular movements intact.   Pulmonary:      Effort: Pulmonary effort is normal.   Musculoskeletal:      Cervical back: Neck supple.   Neurological:      Mental Status: She is alert.   Psychiatric:         Mood and Affect: Mood normal.         Behavior: Behavior is cooperative.         Assessment/Plan  Problem List Items Addressed This Visit       Severe vascular dementia with agitation (Multi)     Secured unit for safety  Assist with care         Depression with anxiety - Primary      Zoloft   Depakote  Discontinue Ativan per GDR  continue to monitor         Failure to thrive in adult     Hospice for comfort care measures           Medications, treatments, and labs reviewed  Continue medications and treatments as listed in EMR    Scribe Attestation  ITonya Scribe   attest that this documentation has been prepared under the direction and in the presence of PETRA Timmons    Provider Attestation - Scribe documentation  All medical record entries made by the Scribe were at my direction and personally dictated by me. I have reviewed the chart and agree that the record accurately reflects my personal performance of the history, physical exam, discussion and plan.   PETRA Timmons            Electronically Signed By: PETRA Timmons   7/10/24 12:27 PM

## 2024-07-02 NOTE — PROGRESS NOTES
PROGRESS NOTE    Subjective   Chief complaint: Liseth Briggs is a 89 y.o. female who is a long term care patient being seen and evaluated for anxiety.    HPI:  HPI  Patient is on hospice for comfort care measures.  Nursing called yesterday reported that patient has a GDR for Ativan for diagnosis of anxiety.  Orders placed to discontinue due to symptoms controlled.  Continue to monitor.    Objective   Vital signs: 136/78, 97.5, 61, 18, 96%    Physical Exam  Constitutional:       General: She is not in acute distress.  Eyes:      Extraocular Movements: Extraocular movements intact.   Pulmonary:      Effort: Pulmonary effort is normal.   Musculoskeletal:      Cervical back: Neck supple.   Neurological:      Mental Status: She is alert.   Psychiatric:         Mood and Affect: Mood normal.         Behavior: Behavior is cooperative.         Assessment/Plan   Problem List Items Addressed This Visit       Severe vascular dementia with agitation (Multi)     Secured unit for safety  Assist with care         Depression with anxiety - Primary      Zoloft   Depakote  Discontinue Ativan per GDR  continue to monitor         Failure to thrive in adult     Hospice for comfort care measures           Medications, treatments, and labs reviewed  Continue medications and treatments as listed in EMR    Scribe Attestation  I, Aisha Eastman   attest that this documentation has been prepared under the direction and in the presence of PETRA Timmons    Provider Attestation - Scribe documentation  All medical record entries made by the Scribe were at my direction and personally dictated by me. I have reviewed the chart and agree that the record accurately reflects my personal performance of the history, physical exam, discussion and plan.   PETRA Timmons

## 2024-07-24 ENCOUNTER — NURSING HOME VISIT (OUTPATIENT)
Dept: POST ACUTE CARE | Facility: EXTERNAL LOCATION | Age: 89
End: 2024-07-24
Payer: COMMERCIAL

## 2024-07-24 DIAGNOSIS — F41.8 DEPRESSION WITH ANXIETY: ICD-10-CM

## 2024-07-24 DIAGNOSIS — F01.C11 SEVERE VASCULAR DEMENTIA WITH AGITATION (MULTI): ICD-10-CM

## 2024-07-24 DIAGNOSIS — R62.7 FAILURE TO THRIVE IN ADULT: ICD-10-CM

## 2024-07-24 DIAGNOSIS — I10 HYPERTENSION, ESSENTIAL: ICD-10-CM

## 2024-07-24 PROCEDURE — 99309 SBSQ NF CARE MODERATE MDM 30: CPT | Performed by: INTERNAL MEDICINE

## 2024-07-24 NOTE — PROGRESS NOTES
PROGRESS NOTE    Subjective   Chief complaint: Liseth Briggs is a 89 y.o. female who is a long term care patient being seen and evaluated for monthly general medical care and follow-up    HPI:  HPI  Patient presents for general medical care and f/u.  Patient seen and examined at bedside.  No issues per nursing.  Patient has no acute complaints.  Patient is a hospice for comfort care measures.  Patient has dementia and requires assist with ADLs.  Patient resides on secured unit for safety.  Patient with diagnosis of depression.  Mood is stable.  Denies feeling down and thoughts of harming self or others.  HTN, Denies chest pain and headache.  Mentation at baseline, no acute distress.    Objective   Vital signs: 128/72, 97.9, 74, 18, 97%    Physical Exam  Constitutional:       General: She is not in acute distress.  Eyes:      Extraocular Movements: Extraocular movements intact.   Pulmonary:      Effort: Pulmonary effort is normal.   Musculoskeletal:      Cervical back: Neck supple.   Neurological:      Mental Status: She is alert.   Psychiatric:         Mood and Affect: Mood normal.         Behavior: Behavior is cooperative.         Assessment/Plan   Problem List Items Addressed This Visit       Severe vascular dementia with agitation (Multi)     Secured unit for safety  Assist with care         Hypertension, essential     Losartan potassium  Metoprolol  Monitor blood pressure  AMIE diet         Depression with anxiety      Zoloft   Depakote  continue to monitor         Failure to thrive in adult     Hospice for comfort care measures           Medications, treatments, and labs reviewed  Continue medications and treatments as listed in EMR    Scribe Attestation  I, Aisha Bishop   attest that this documentation has been prepared under the direction and in the presence of Velasquez Talley MD    Provider Attestation - Scribe documentation  All medical record entries made by the Scribe were at my direction and  personally dictated by me. I have reviewed the chart and agree that the record accurately reflects my personal performance of the history, physical exam, discussion and plan.   Velasquez Talley MD

## 2024-07-24 NOTE — LETTER
Patient: Liseth Briggs  : 1920    Encounter Date: 2024    PROGRESS NOTE    Subjective  Chief complaint: Liseth Briggs is a 89 y.o. female who is a long term care patient being seen and evaluated for monthly general medical care and follow-up    HPI:  HPI  Patient presents for general medical care and f/u.  Patient seen and examined at bedside.  No issues per nursing.  Patient has no acute complaints.  Patient is a hospice for comfort care measures.  Patient has dementia and requires assist with ADLs.  Patient resides on secured unit for safety.  Patient with diagnosis of depression.  Mood is stable.  Denies feeling down and thoughts of harming self or others.  HTN, Denies chest pain and headache.  Mentation at baseline, no acute distress.    Objective  Vital signs: 128/72, 97.9, 74, 18, 97%    Physical Exam  Constitutional:       General: She is not in acute distress.  Eyes:      Extraocular Movements: Extraocular movements intact.   Pulmonary:      Effort: Pulmonary effort is normal.   Musculoskeletal:      Cervical back: Neck supple.   Neurological:      Mental Status: She is alert.   Psychiatric:         Mood and Affect: Mood normal.         Behavior: Behavior is cooperative.         Assessment/Plan  Problem List Items Addressed This Visit       Severe vascular dementia with agitation (Multi)     Secured unit for safety  Assist with care         Hypertension, essential     Losartan potassium  Metoprolol  Monitor blood pressure  AMIE diet         Depression with anxiety      Zoloft   Depakote  continue to monitor         Failure to thrive in adult     Hospice for comfort care measures           Medications, treatments, and labs reviewed  Continue medications and treatments as listed in EMR    Scribe Attestation  I, Lelo Shafer, Scribe   attest that this documentation has been prepared under the direction and in the presence of Velasquez Talley MD    Provider Attestation - Scribe documentation  All  medical record entries made by the Scribe were at my direction and personally dictated by me. I have reviewed the chart and agree that the record accurately reflects my personal performance of the history, physical exam, discussion and plan.   Velasquez Talley MD            Electronically Signed By: Velasquez Talley MD   7/24/24  3:14 PM

## 2024-08-23 ENCOUNTER — NURSING HOME VISIT (OUTPATIENT)
Dept: POST ACUTE CARE | Facility: EXTERNAL LOCATION | Age: 89
End: 2024-08-23
Payer: COMMERCIAL

## 2024-08-23 DIAGNOSIS — I10 HYPERTENSION, ESSENTIAL: ICD-10-CM

## 2024-08-23 DIAGNOSIS — F01.C11 SEVERE VASCULAR DEMENTIA WITH AGITATION (MULTI): Primary | ICD-10-CM

## 2024-08-23 DIAGNOSIS — F41.8 DEPRESSION WITH ANXIETY: ICD-10-CM

## 2024-08-23 NOTE — PROGRESS NOTES
PROGRESS NOTE    Subjective   Chief complaint: Liseth Briggs is a 89 y.o. female who is a long term care patient being seen and evaluated for monthly general medical care and follow-up    HPI:  HPI  Patient presents for general medical care and f/u.  Patient seen and examined at bedside.  No issues per nursing.  Patient has no acute complaints.  Patient does reside on secured unit for safety.  Patient also on hospice for comfort care measures.  Patient has dementia and requires assist with ADLs.  HTN Denies chest pain and headache.  Patient with diagnosis of depression.  Mood is stable.  Denies feeling down and thoughts of harming self or others.  Mentation at baseline, no acute distress.  Patient appears comfortable.    Objective   Vital signs: 130/75, 97.2, 76, 20, 96%    Physical Exam  Constitutional:       General: She is not in acute distress.  Eyes:      Extraocular Movements: Extraocular movements intact.   Pulmonary:      Effort: Pulmonary effort is normal.   Musculoskeletal:      Cervical back: Neck supple.   Neurological:      Mental Status: She is alert.   Psychiatric:         Mood and Affect: Mood normal.         Behavior: Behavior is cooperative.         Assessment/Plan   Problem List Items Addressed This Visit       Severe vascular dementia with agitation (Multi) - Primary     Secured unit for safety  Assist with care         Hypertension, essential     Losartan potassium  Metoprolol  Monitor blood pressure  AMIE diet         Depression with anxiety      Zoloft   Depakote  continue to monitor          Medications, treatments, and labs reviewed  Continue medications and treatments as listed in EMR    Scribe Attestation  I, Aisha Eastman   attest that this documentation has been prepared under the direction and in the presence of Velasquez Talley MD    Provider Attestation - Scribe documentation  All medical record entries made by the Scribe were at my direction and personally dictated by me. I  have reviewed the chart and agree that the record accurately reflects my personal performance of the history, physical exam, discussion and plan.   Velasquez Talley MD

## 2024-08-23 NOTE — LETTER
Patient: Liseth Briggs  : 1920    Encounter Date: 2024    PROGRESS NOTE    Subjective  Chief complaint: Liseth Briggs is a 89 y.o. female who is a long term care patient being seen and evaluated for monthly general medical care and follow-up    HPI:  HPI  Patient presents for general medical care and f/u.  Patient seen and examined at bedside.  No issues per nursing.  Patient has no acute complaints.  Patient does reside on secured unit for safety.  Patient also on hospice for comfort care measures.  Patient has dementia and requires assist with ADLs.  HTN Denies chest pain and headache.  Patient with diagnosis of depression.  Mood is stable.  Denies feeling down and thoughts of harming self or others.  Mentation at baseline, no acute distress.  Patient appears comfortable.    Objective  Vital signs: 130/75, 97.2, 76, 20, 96%    Physical Exam  Constitutional:       General: She is not in acute distress.  Eyes:      Extraocular Movements: Extraocular movements intact.   Pulmonary:      Effort: Pulmonary effort is normal.   Musculoskeletal:      Cervical back: Neck supple.   Neurological:      Mental Status: She is alert.   Psychiatric:         Mood and Affect: Mood normal.         Behavior: Behavior is cooperative.         Assessment/Plan  Problem List Items Addressed This Visit       Severe vascular dementia with agitation (Multi) - Primary     Secured unit for safety  Assist with care         Hypertension, essential     Losartan potassium  Metoprolol  Monitor blood pressure  AMIE diet         Depression with anxiety      Zoloft   Depakote  continue to monitor          Medications, treatments, and labs reviewed  Continue medications and treatments as listed in EMR    Scribe Attestation  I, Aisha Eastman   attest that this documentation has been prepared under the direction and in the presence of Velasquez Talley MD    Provider Attestation - Scribe documentation  All medical record entries made by  the Scribe were at my direction and personally dictated by me. I have reviewed the chart and agree that the record accurately reflects my personal performance of the history, physical exam, discussion and plan.   Velasquez Talley MD            Electronically Signed By: Velasquez Talley MD   8/23/24  5:19 PM

## 2024-09-19 ENCOUNTER — NURSING HOME VISIT (OUTPATIENT)
Dept: POST ACUTE CARE | Facility: EXTERNAL LOCATION | Age: 89
End: 2024-09-19
Payer: COMMERCIAL

## 2024-09-19 DIAGNOSIS — F01.C11 SEVERE VASCULAR DEMENTIA WITH AGITATION: ICD-10-CM

## 2024-09-19 DIAGNOSIS — T78.40XD ALLERGY, SUBSEQUENT ENCOUNTER: Primary | ICD-10-CM

## 2024-09-19 DIAGNOSIS — I10 HYPERTENSION, ESSENTIAL: ICD-10-CM

## 2024-09-19 DIAGNOSIS — F41.8 DEPRESSION WITH ANXIETY: ICD-10-CM

## 2024-09-19 PROCEDURE — 99308 SBSQ NF CARE LOW MDM 20: CPT | Performed by: NURSE PRACTITIONER

## 2024-09-19 NOTE — LETTER
Patient: Liseth Briggs  : 1920    Encounter Date: 2024    PROGRESS NOTE    Subjective  Chief complaint: Liseth Briggs is a 89 y.o. female who is a long term care patient being seen and evaluated for medication review.    HPI:  HPIPatient presents for medication review. Asked to evaluate for continued use of fexofenadine. Patient denies itching, congestion, watery eyes.      Objective  Vital signs: 119/76-98.6-77-18-96%    Physical Exam  Constitutional:       General: She is not in acute distress.  Eyes:      Extraocular Movements: Extraocular movements intact.   Pulmonary:      Effort: Pulmonary effort is normal.   Musculoskeletal:      Cervical back: Neck supple.   Neurological:      Mental Status: She is alert.   Psychiatric:         Mood and Affect: Mood normal.         Behavior: Behavior is cooperative.         Assessment/Plan  Problem List Items Addressed This Visit       Severe vascular dementia with agitation     Secured unit for safety  Assist with care         Hypertension, essential     Losartan potassium  Metoprolol  Monitor blood pressure  AMIE diet         Depression with anxiety      Zoloft   Depakote  continue to monitor         Allergy - Primary     Will trial discontinue fexofenadine          Medications, treatments, and labs reviewed  Continue medications and treatments as listed in EMR    Scribe Attestation  ICarmine Scribe   attest that this documentation has been prepared under the direction and in the presence of PETRA Timmons    Provider Attestation - Scribe documentation  All medical record entries made by the Scribe were at my direction and personally dictated by me. I have reviewed the chart and agree that the record accurately reflects my personal performance of the history, physical exam, discussion and plan.   PETRA Timmons    An interactive audio and/or video telecommunication system which permits real time communications between the  patient (at the originating site) and provider (at a distant site) was utilized to provide this telehealth service after obtaining verbal consent.            Electronically Signed By: PETRA Timmons   10/20/24 10:34 PM

## 2024-09-25 ENCOUNTER — NURSING HOME VISIT (OUTPATIENT)
Dept: POST ACUTE CARE | Facility: EXTERNAL LOCATION | Age: 89
End: 2024-09-25
Payer: COMMERCIAL

## 2024-09-25 DIAGNOSIS — I10 HYPERTENSION, ESSENTIAL: ICD-10-CM

## 2024-09-25 DIAGNOSIS — F01.C11 SEVERE VASCULAR DEMENTIA WITH AGITATION: ICD-10-CM

## 2024-09-25 DIAGNOSIS — R62.7 FAILURE TO THRIVE IN ADULT: Primary | ICD-10-CM

## 2024-09-25 DIAGNOSIS — F41.8 DEPRESSION WITH ANXIETY: ICD-10-CM

## 2024-09-25 NOTE — LETTER
Patient: Liseth Briggs  : 1920    Encounter Date: 2024    PROGRESS NOTE    Subjective  Chief complaint: Liseth Briggs is a 89 y.o. female who is a long term care patient being seen and evaluated for monthly general medical care and follow-up    HPI:  HPI  Patient presents for general medical care and f/u.  Patient seen and examined at bedside.  No issues per nursing.  Patient has no acute complaints.  Patient has dementia and requires assist with ADLs.  Patient does reside on secured unit for safety.  Patient is on hospice for comfort care measures.  HTN BP at goal.  Denies chest pain and headache.  Patient with diagnosis of depression.  Mood is stable.  Denies feeling down and thoughts of harming self or others.  Mentation at baseline, no acute distress.  Objective  Vital signs: 118/76, 98.6, 77, 18, 96%    Physical Exam  Constitutional:       General: She is not in acute distress.  Eyes:      Extraocular Movements: Extraocular movements intact.   Pulmonary:      Effort: Pulmonary effort is normal.   Musculoskeletal:      Cervical back: Neck supple.   Neurological:      Mental Status: She is alert.   Psychiatric:         Mood and Affect: Mood normal.         Behavior: Behavior is cooperative.         Assessment/Plan  Problem List Items Addressed This Visit       Severe vascular dementia with agitation (Multi)     Secured unit for safety  Assist with care         Hypertension, essential     Losartan potassium  Metoprolol  Monitor blood pressure  AMIE diet         Depression with anxiety      Zoloft   Depakote  continue to monitor         Failure to thrive in adult - Primary     Hospice for comfort care measures           Medications, treatments, and labs reviewed  Continue medications and treatments as listed in EMR    Scribe Attestation  I, Aisha Eastman   attest that this documentation has been prepared under the direction and in the presence of Velasquez Talley MD    Provider Attestation -  Scribe documentation  All medical record entries made by the Scribe were at my direction and personally dictated by me. I have reviewed the chart and agree that the record accurately reflects my personal performance of the history, physical exam, discussion and plan.   Velasquez Talley MD            Electronically Signed By: Velasquez Talley MD   9/26/24  1:53 PM

## 2024-09-25 NOTE — PROGRESS NOTES
PROGRESS NOTE    Subjective   Chief complaint: Liseth Briggs is a 89 y.o. female who is a long term care patient being seen and evaluated for monthly general medical care and follow-up    HPI:  HPI  Patient presents for general medical care and f/u.  Patient seen and examined at bedside.  No issues per nursing.  Patient has no acute complaints.  Patient has dementia and requires assist with ADLs.  Patient does reside on secured unit for safety.  Patient is on hospice for comfort care measures.  HTN BP at goal.  Denies chest pain and headache.  Patient with diagnosis of depression.  Mood is stable.  Denies feeling down and thoughts of harming self or others.  Mentation at baseline, no acute distress.  Objective   Vital signs: 118/76, 98.6, 77, 18, 96%    Physical Exam  Constitutional:       General: She is not in acute distress.  Eyes:      Extraocular Movements: Extraocular movements intact.   Pulmonary:      Effort: Pulmonary effort is normal.   Musculoskeletal:      Cervical back: Neck supple.   Neurological:      Mental Status: She is alert.   Psychiatric:         Mood and Affect: Mood normal.         Behavior: Behavior is cooperative.         Assessment/Plan   Problem List Items Addressed This Visit       Severe vascular dementia with agitation (Multi)     Secured unit for safety  Assist with care         Hypertension, essential     Losartan potassium  Metoprolol  Monitor blood pressure  AMIE diet         Depression with anxiety      Zoloft   Depakote  continue to monitor         Failure to thrive in adult - Primary     Hospice for comfort care measures           Medications, treatments, and labs reviewed  Continue medications and treatments as listed in EMR    Scribe Attestation  I, Aisha Eastman   attest that this documentation has been prepared under the direction and in the presence of Velasquez Talley MD    Provider Attestation - Scribe documentation  All medical record entries made by the Scribe  were at my direction and personally dictated by me. I have reviewed the chart and agree that the record accurately reflects my personal performance of the history, physical exam, discussion and plan.   Velasquez Talley MD

## 2024-10-20 PROBLEM — T78.40XA ALLERGY: Status: ACTIVE | Noted: 2024-10-20

## 2024-10-20 NOTE — PROGRESS NOTES
PROGRESS NOTE    Subjective   Chief complaint: Liseth Briggs is a 89 y.o. female who is a long term care patient being seen and evaluated for medication review.    HPI:  HPIPatient presents for medication review. Asked to evaluate for continued use of fexofenadine. Patient denies itching, congestion, watery eyes.      Objective   Vital signs: 119/76-98.6-77-18-96%    Physical Exam  Constitutional:       General: She is not in acute distress.  Eyes:      Extraocular Movements: Extraocular movements intact.   Pulmonary:      Effort: Pulmonary effort is normal.   Musculoskeletal:      Cervical back: Neck supple.   Neurological:      Mental Status: She is alert.   Psychiatric:         Mood and Affect: Mood normal.         Behavior: Behavior is cooperative.         Assessment/Plan   Problem List Items Addressed This Visit       Severe vascular dementia with agitation     Secured unit for safety  Assist with care         Hypertension, essential     Losartan potassium  Metoprolol  Monitor blood pressure  AMIE diet         Depression with anxiety      Zoloft   Depakote  continue to monitor         Allergy - Primary     Will trial discontinue fexofenadine          Medications, treatments, and labs reviewed  Continue medications and treatments as listed in EMR    Scribe Attestation  ICarmine Scribe   attest that this documentation has been prepared under the direction and in the presence of PETRA Timmons    Provider Attestation - Scribe documentation  All medical record entries made by the Scribe were at my direction and personally dictated by me. I have reviewed the chart and agree that the record accurately reflects my personal performance of the history, physical exam, discussion and plan.   PETRA Timmons    An interactive audio and/or video telecommunication system which permits real time communications between the patient (at the originating site) and provider (at a distant site) was  utilized to provide this telehealth service after obtaining verbal consent.

## 2024-10-21 ENCOUNTER — NURSING HOME VISIT (OUTPATIENT)
Dept: POST ACUTE CARE | Facility: EXTERNAL LOCATION | Age: 89
End: 2024-10-21
Payer: COMMERCIAL

## 2024-10-21 DIAGNOSIS — F41.8 DEPRESSION WITH ANXIETY: ICD-10-CM

## 2024-10-21 DIAGNOSIS — I10 HYPERTENSION, ESSENTIAL: ICD-10-CM

## 2024-10-21 DIAGNOSIS — F03.918 DEMENTIA WITH AGGRESSIVE BEHAVIOR (MULTI): ICD-10-CM

## 2024-10-21 NOTE — LETTER
Patient: Liseth Briggs  : 1920    Encounter Date: 10/21/2024    PROGRESS NOTE    Subjective  Chief complaint: Liseth Briggs is a 89 y.o. female who is a long term care patient being seen and evaluated for  medication review.    HPI:  HPI  Patient presents for medication review.  I was asked to see patient to evaluate for continued use of Zoloft and Depakote.  Patient's mood has been stable according to the staff.  No new concerns otherwise.    Objective  Vital signs:   118/76, 77, 97.9, 18, 96%    Physical Exam  Constitutional:       General: She is not in acute distress.  Eyes:      Extraocular Movements: Extraocular movements intact.   Pulmonary:      Effort: Pulmonary effort is normal.   Musculoskeletal:      Cervical back: Neck supple.   Neurological:      Mental Status: She is alert.   Psychiatric:         Mood and Affect: Mood normal.         Behavior: Behavior is cooperative.         Assessment/Plan  Problem List Items Addressed This Visit       Dementia with aggressive behavior (Multi)     Will continue Depakote as ordered, GDR contraindicated and may cause return/worsening symptoms         Depression with anxiety     Will trial discontinuing Zoloft         Hypertension, essential     Losartan potassium  Metoprolol  Monitor blood pressure  AMIE diet          Medications, treatments, and labs reviewed  Continue medications and treatments as listed in EMR    PETRA Timmons    Scribe Attestation  Zulema FORD Scribe   attest that this documentation has been prepared under the direction and in the presence of PETRA Timmons    Provider Attestation - Scribe documentation  All medical record entries made by the Scribe were at my direction and personally dictated by me. I have reviewed the chart and agree that the record accurately reflects my personal performance of the history, physical exam, discussion and plan.      Electronically Signed By: PETRA Timmons    11/28/24  4:13 PM

## 2024-10-23 ENCOUNTER — NURSING HOME VISIT (OUTPATIENT)
Dept: POST ACUTE CARE | Facility: EXTERNAL LOCATION | Age: 89
End: 2024-10-23
Payer: COMMERCIAL

## 2024-10-23 DIAGNOSIS — I10 HYPERTENSION, ESSENTIAL: ICD-10-CM

## 2024-10-23 DIAGNOSIS — R62.7 FAILURE TO THRIVE IN ADULT: ICD-10-CM

## 2024-10-23 DIAGNOSIS — F01.C11 SEVERE VASCULAR DEMENTIA WITH AGITATION: Primary | ICD-10-CM

## 2024-10-23 DIAGNOSIS — F41.8 DEPRESSION WITH ANXIETY: ICD-10-CM

## 2024-10-23 PROCEDURE — 99309 SBSQ NF CARE MODERATE MDM 30: CPT | Performed by: INTERNAL MEDICINE

## 2024-10-23 NOTE — PROGRESS NOTES
PROGRESS NOTE    Subjective   Chief complaint: Liseth Briggs is a 89 y.o. female who is a long term care patient being seen and evaluated for monthly general medical care and follow-up    HPI:  HPI  Patient presents for general medical care and f/u.  Patient seen and examined at bedside.  No issues per nursing.  Patient has no acute complaints.  Patient is on hospice for comfort care measures.  Patient has dementia and requires assist with ADLs.  Patient does reside on secured unit for safety.  HTN BP at goal.  Denies chest pain and headache.  Patient with diagnosis of depression.  Mood is stable.  Denies feeling down and thoughts of harming self or others.  Mentation at baseline, no acute distress.    Objective   Vital signs: 118/76, 97.9, 70, 17, 96%    Physical Exam  Constitutional:       General: She is not in acute distress.     Comments: Blind  Hard of hearing   Eyes:      Extraocular Movements: Extraocular movements intact.   Pulmonary:      Effort: Pulmonary effort is normal.   Musculoskeletal:      Cervical back: Neck supple.   Neurological:      Mental Status: She is alert.      Comments: A&O X1   Psychiatric:         Mood and Affect: Mood normal.         Behavior: Behavior is cooperative.         Assessment/Plan   Problem List Items Addressed This Visit       Severe vascular dementia with agitation - Primary     Secured unit for safety  Assist with care         Hypertension, essential     Losartan potassium  Metoprolol  Monitor blood pressure  AMIE diet         Depression with anxiety     Continue antidepressants  Monitor mood and behaviors         Failure to thrive in adult     Hospice for comfort care measures           Medications, treatments, and labs reviewed  Continue medications and treatments as listed in EMR    Scribe Attestation  I, Aisha Eastman   attest that this documentation has been prepared under the direction and in the presence of Velasquez Talley MD    Provider Attestation -  Scribe documentation  All medical record entries made by the Scribe were at my direction and personally dictated by me. I have reviewed the chart and agree that the record accurately reflects my personal performance of the history, physical exam, discussion and plan.   Velasquez Talley MD

## 2024-10-23 NOTE — LETTER
Patient: Liseth Briggs  : 1920    Encounter Date: 10/23/2024    PROGRESS NOTE    Subjective  Chief complaint: Liseth Briggs is a 89 y.o. female who is a long term care patient being seen and evaluated for monthly general medical care and follow-up    HPI:  HPI  Patient presents for general medical care and f/u.  Patient seen and examined at bedside.  No issues per nursing.  Patient has no acute complaints.  Patient is on hospice for comfort care measures.  Patient has dementia and requires assist with ADLs.  Patient does reside on secured unit for safety.  HTN BP at goal.  Denies chest pain and headache.  Patient with diagnosis of depression.  Mood is stable.  Denies feeling down and thoughts of harming self or others.  Mentation at baseline, no acute distress.    Objective  Vital signs: 118/76, 97.9, 70, 17, 96%    Physical Exam  Constitutional:       General: She is not in acute distress.     Comments: Blind  Hard of hearing   Eyes:      Extraocular Movements: Extraocular movements intact.   Pulmonary:      Effort: Pulmonary effort is normal.   Musculoskeletal:      Cervical back: Neck supple.   Neurological:      Mental Status: She is alert.      Comments: A&O X1   Psychiatric:         Mood and Affect: Mood normal.         Behavior: Behavior is cooperative.         Assessment/Plan  Problem List Items Addressed This Visit       Severe vascular dementia with agitation - Primary     Secured unit for safety  Assist with care         Hypertension, essential     Losartan potassium  Metoprolol  Monitor blood pressure  AMIE diet         Depression with anxiety     Continue antidepressants  Monitor mood and behaviors         Failure to thrive in adult     Hospice for comfort care measures           Medications, treatments, and labs reviewed  Continue medications and treatments as listed in EMR    Scribe Attestation  LILIANA, Aisha Eastman   attest that this documentation has been prepared under the direction and  in the presence of Velasquez Talley MD    Provider Attestation - Scribe documentation  All medical record entries made by the Scribe were at my direction and personally dictated by me. I have reviewed the chart and agree that the record accurately reflects my personal performance of the history, physical exam, discussion and plan.   Velasquez Talley MD            Electronically Signed By: Velasquez Talley MD   10/24/24  4:37 PM

## 2024-11-20 PROBLEM — F03.918 DEMENTIA WITH AGGRESSIVE BEHAVIOR (MULTI): Status: ACTIVE | Noted: 2024-11-20

## 2024-11-20 NOTE — PROGRESS NOTES
PROGRESS NOTE    Subjective   Chief complaint: Liseth Briggs is a 89 y.o. female who is a long term care patient being seen and evaluated for  medication review.    HPI:  HPI  Patient presents for medication review.  I was asked to see patient to evaluate for continued use of Zoloft and Depakote.  Patient's mood has been stable according to the staff.  No new concerns otherwise.    Objective   Vital signs:   118/76, 77, 97.9, 18, 96%    Physical Exam  Constitutional:       General: She is not in acute distress.  Eyes:      Extraocular Movements: Extraocular movements intact.   Pulmonary:      Effort: Pulmonary effort is normal.   Musculoskeletal:      Cervical back: Neck supple.   Neurological:      Mental Status: She is alert.   Psychiatric:         Mood and Affect: Mood normal.         Behavior: Behavior is cooperative.         Assessment/Plan   Problem List Items Addressed This Visit       Dementia with aggressive behavior (Multi)     Will continue Depakote as ordered, GDR contraindicated and may cause return/worsening symptoms         Depression with anxiety     Will trial discontinuing Zoloft         Hypertension, essential     Losartan potassium  Metoprolol  Monitor blood pressure  AMIE diet          Medications, treatments, and labs reviewed  Continue medications and treatments as listed in EMR    PETRA Timmons    Scribe Attestation  Zulema FORD   attest that this documentation has been prepared under the direction and in the presence of PETRA Timmons    Provider Attestation - Scribe documentation  All medical record entries made by the Scribe were at my direction and personally dictated by me. I have reviewed the chart and agree that the record accurately reflects my personal performance of the history, physical exam, discussion and plan.

## 2024-11-22 ENCOUNTER — NURSING HOME VISIT (OUTPATIENT)
Dept: POST ACUTE CARE | Facility: EXTERNAL LOCATION | Age: 89
End: 2024-11-22
Payer: COMMERCIAL

## 2024-11-22 DIAGNOSIS — I10 HYPERTENSION, ESSENTIAL: ICD-10-CM

## 2024-11-22 DIAGNOSIS — R62.7 FAILURE TO THRIVE IN ADULT: ICD-10-CM

## 2024-11-22 DIAGNOSIS — F41.8 DEPRESSION WITH ANXIETY: ICD-10-CM

## 2024-11-22 DIAGNOSIS — F01.C11 SEVERE VASCULAR DEMENTIA WITH AGITATION: Primary | ICD-10-CM

## 2024-11-22 NOTE — LETTER
Patient: Liseth Briggs  : 1920    Encounter Date: 2024    PROGRESS NOTE    Subjective  Chief complaint: Liseth Briggs is a 89 y.o. female who is a long term care patient being seen and evaluated for monthly general medical care and follow-up    HPI:  HPI  Patient presents for general medical care and f/u.  Patient seen and examined at bedside.  No issues per nursing.  Patient has no acute complaints.  Patient has dementia and requires assist with ADLs.  Patient does reside on secured unit for safety.  Patient is on hospice for comfort care measures.  HTN BP at goal.  Denies chest pain and headache.  Patient with diagnosis of depression.  Mood is stable.  Denies feeling down and thoughts of harming self or others.  Mentation at baseline, no acute distress.    Objective  Vital signs: 106/69, 97.3, 83, 18, 97%    Physical Exam  Constitutional:       General: She is not in acute distress.     Comments: Blind  Hard of hearing   Eyes:      Extraocular Movements: Extraocular movements intact.   Pulmonary:      Effort: Pulmonary effort is normal.   Musculoskeletal:      Cervical back: Neck supple.   Neurological:      Mental Status: She is alert.      Comments: A&O X1   Psychiatric:         Mood and Affect: Mood normal.         Behavior: Behavior is cooperative.         Assessment/Plan  Problem List Items Addressed This Visit       Severe vascular dementia with agitation - Primary     Secured unit for safety  Assist with care         Hypertension, essential     Losartan potassium  Metoprolol  Monitor blood pressure  AMIE diet         Depression with anxiety     Monitor mood and behaviors         Failure to thrive in adult     Hospice for comfort care measures           Medications, treatments, and labs reviewed  Continue medications and treatments as listed in EMR    Clementeibe Attestation  LILIANA, Aisha Eastman   attest that this documentation has been prepared under the direction and in the presence of Velasquez  SHAYLA Talley MD    Provider Attestation - Scribe documentation  All medical record entries made by the Scribe were at my direction and personally dictated by me. I have reviewed the chart and agree that the record accurately reflects my personal performance of the history, physical exam, discussion and plan.   Velasquez Talley MD            Electronically Signed By: Velasquez Talley MD   11/23/24  3:37 PM

## 2024-11-22 NOTE — PROGRESS NOTES
PROGRESS NOTE    Subjective   Chief complaint: Liseth Briggs is a 89 y.o. female who is a long term care patient being seen and evaluated for monthly general medical care and follow-up    HPI:  HPI  Patient presents for general medical care and f/u.  Patient seen and examined at bedside.  No issues per nursing.  Patient has no acute complaints.  Patient has dementia and requires assist with ADLs.  Patient does reside on secured unit for safety.  Patient is on hospice for comfort care measures.  HTN BP at goal.  Denies chest pain and headache.  Patient with diagnosis of depression.  Mood is stable.  Denies feeling down and thoughts of harming self or others.  Mentation at baseline, no acute distress.    Objective   Vital signs: 106/69, 97.3, 83, 18, 97%    Physical Exam  Constitutional:       General: She is not in acute distress.     Comments: Blind  Hard of hearing   Eyes:      Extraocular Movements: Extraocular movements intact.   Pulmonary:      Effort: Pulmonary effort is normal.   Musculoskeletal:      Cervical back: Neck supple.   Neurological:      Mental Status: She is alert.      Comments: A&O X1   Psychiatric:         Mood and Affect: Mood normal.         Behavior: Behavior is cooperative.         Assessment/Plan   Problem List Items Addressed This Visit       Severe vascular dementia with agitation - Primary     Secured unit for safety  Assist with care         Hypertension, essential     Losartan potassium  Metoprolol  Monitor blood pressure  AMIE diet         Depression with anxiety     Monitor mood and behaviors         Failure to thrive in adult     Hospice for comfort care measures           Medications, treatments, and labs reviewed  Continue medications and treatments as listed in EMR    Scribe Attestation  I, Aisha Eastman   attest that this documentation has been prepared under the direction and in the presence of Velasquez Talley MD    Provider Attestation - Scribe documentation  All  medical record entries made by the Scribe were at my direction and personally dictated by me. I have reviewed the chart and agree that the record accurately reflects my personal performance of the history, physical exam, discussion and plan.   Velasquez Talley MD

## 2024-11-26 ENCOUNTER — NURSING HOME VISIT (OUTPATIENT)
Dept: POST ACUTE CARE | Facility: EXTERNAL LOCATION | Age: 89
End: 2024-11-26
Payer: COMMERCIAL

## 2024-11-26 DIAGNOSIS — F03.918 DEMENTIA WITH AGGRESSIVE BEHAVIOR (MULTI): Primary | ICD-10-CM

## 2024-11-26 DIAGNOSIS — F41.8 DEPRESSION WITH ANXIETY: ICD-10-CM

## 2024-11-26 DIAGNOSIS — I10 HYPERTENSION, ESSENTIAL: ICD-10-CM

## 2024-11-26 PROCEDURE — 99308 SBSQ NF CARE LOW MDM 20: CPT | Performed by: NURSE PRACTITIONER

## 2024-11-26 NOTE — LETTER
Patient: Liseth Briggs  : 1920    Encounter Date: 2024    PROGRESS NOTE    Subjective  Chief complaint: Liseth Briggs is a 89 y.o. female who is a long term care patient being seen and evaluated for medical review.    HPI:  HPIAsked to see patient to evaluate for continued use of Zoloft and Depakote. Patient with Dx dementia with behaviors and depression. Mood has been stable. No new concerns otherwise. Seen and examined at bedside in nad.      Objective  Vital signs: 106/69, 97.3, 83, 18, 97%    Physical Exam  Constitutional:       General: She is not in acute distress.  Eyes:      Extraocular Movements: Extraocular movements intact.   Pulmonary:      Effort: Pulmonary effort is normal.   Musculoskeletal:      Cervical back: Neck supple.   Neurological:      Mental Status: She is alert.   Psychiatric:         Mood and Affect: Mood normal.         Assessment/Plan  Problem List Items Addressed This Visit       Hypertension, essential     Losartan potassium  Metoprolol  Monitor blood pressure  AMIE diet         Depression with anxiety     Will trial DC Zoloft         Dementia with aggressive behavior (Multi) - Primary     Continue Depakote as ordered          Medications, treatments, and labs reviewed  Continue medications and treatments as listed in EMR    Scribe Attestation  ICarmine Scribe   attest that this documentation has been prepared under the direction and in the presence of PETRA Timmons    Provider Attestation - Scribe documentation  All medical record entries made by the Scribe were at my direction and personally dictated by me. I have reviewed the chart and agree that the record accurately reflects my personal performance of the history, physical exam, discussion and plan.   PETRA Timmons            Electronically Signed By: PETRA Timmons   24  9:34 PM

## 2024-12-04 NOTE — ASSESSMENT & PLAN NOTE
BP at goal  Continue antihypertensives  AMIE diet  Monitor BP   
Continue antibiotic until complete, 1/17/2024  
Hospice for comfort measures with comfort meds in place  
Secured unit for safety  Assist with care  
Hypothyroidism/no cold intolerance/no heat intolerance/no striae/no voice change/no hirsutism

## 2024-12-16 NOTE — PROGRESS NOTES
PROGRESS NOTE    Subjective   Chief complaint: Liseth Briggs is a 89 y.o. female who is a long term care patient being seen and evaluated for medical review.    HPI:  HPIAsked to see patient to evaluate for continued use of Zoloft and Depakote. Patient with Dx dementia with behaviors and depression. Mood has been stable. No new concerns otherwise. Seen and examined at bedside in nad.      Objective   Vital signs: 106/69, 97.3, 83, 18, 97%    Physical Exam  Constitutional:       General: She is not in acute distress.  Eyes:      Extraocular Movements: Extraocular movements intact.   Pulmonary:      Effort: Pulmonary effort is normal.   Musculoskeletal:      Cervical back: Neck supple.   Neurological:      Mental Status: She is alert.   Psychiatric:         Mood and Affect: Mood normal.         Assessment/Plan   Problem List Items Addressed This Visit       Hypertension, essential     Losartan potassium  Metoprolol  Monitor blood pressure  AMIE diet         Depression with anxiety     Will trial DC Zoloft         Dementia with aggressive behavior (Multi) - Primary     Continue Depakote as ordered          Medications, treatments, and labs reviewed  Continue medications and treatments as listed in EMR    Scribe Attestation  ICarmine Scribe   attest that this documentation has been prepared under the direction and in the presence of PETRA Timmons    Provider Attestation - Scribe documentation  All medical record entries made by the Scribe were at my direction and personally dictated by me. I have reviewed the chart and agree that the record accurately reflects my personal performance of the history, physical exam, discussion and plan.   PETRA Timmons

## 2024-12-26 ENCOUNTER — NURSING HOME VISIT (OUTPATIENT)
Dept: POST ACUTE CARE | Facility: EXTERNAL LOCATION | Age: 89
End: 2024-12-26
Payer: COMMERCIAL

## 2024-12-26 DIAGNOSIS — I10 HYPERTENSION, ESSENTIAL: ICD-10-CM

## 2024-12-26 DIAGNOSIS — R62.7 FAILURE TO THRIVE IN ADULT: Primary | ICD-10-CM

## 2024-12-26 DIAGNOSIS — F01.C11 SEVERE VASCULAR DEMENTIA WITH AGITATION: ICD-10-CM

## 2024-12-26 NOTE — PROGRESS NOTES
PROGRESS NOTE    Subjective   Chief complaint: Liseth Briggs is a 89 y.o. female who is a long term care patient being seen and evaluated for monthly general medical care and follow-up    HPI:  HPI  Patient presents for general medical care and f/u.  Patient seen and examined at bedside.  No issues per nursing.  Patient has no acute complaints.  Patient has dementia and requires assist with ADLs.  Patient resides on secured unit.  Patient is on hospice for comfort care measures.  HTN  Denies chest pain and headache.  Patient with diagnosis of depression.  Mood is stable.  Denies feeling down and thoughts of harming self or others.  Mentation at baseline, no acute distress.    Objective   Vital signs: 133/75, 97.8, 81, 16, 95%    Physical Exam  Constitutional:       General: She is not in acute distress.     Comments: Blind  Hard of hearing   Eyes:      Extraocular Movements: Extraocular movements intact.   Pulmonary:      Effort: Pulmonary effort is normal.   Musculoskeletal:      Cervical back: Neck supple.   Neurological:      Mental Status: She is alert.      Comments: A&O X1   Psychiatric:         Mood and Affect: Mood normal.         Behavior: Behavior is cooperative.         Assessment/Plan   Problem List Items Addressed This Visit       Severe vascular dementia with agitation     Secured unit for safety  Assist with care         Hypertension, essential     Losartan potassium  Metoprolol  Monitor blood pressure  AMIE diet         Failure to thrive in adult - Primary     Hospice for comfort care measures           Medications, treatments, and labs reviewed  Continue medications and treatments as listed in EMR    Scribe Attestation  I, Aisha Eastman   attest that this documentation has been prepared under the direction and in the presence of Velasquez Talley MD    Provider Attestation - Scribe documentation  All medical record entries made by the Scribe were at my direction and personally dictated by me.  I have reviewed the chart and agree that the record accurately reflects my personal performance of the history, physical exam, discussion and plan.   Velasquez Talley MD

## 2024-12-26 NOTE — LETTER
Patient: Liseth Briggs  : 1920    Encounter Date: 2024    PROGRESS NOTE    Subjective  Chief complaint: Liseth Briggs is a 89 y.o. female who is a long term care patient being seen and evaluated for monthly general medical care and follow-up    HPI:  HPI  Patient presents for general medical care and f/u.  Patient seen and examined at bedside.  No issues per nursing.  Patient has no acute complaints.  Patient has dementia and requires assist with ADLs.  Patient resides on secured unit.  Patient is on hospice for comfort care measures.  HTN  Denies chest pain and headache.  Patient with diagnosis of depression.  Mood is stable.  Denies feeling down and thoughts of harming self or others.  Mentation at baseline, no acute distress.    Objective  Vital signs: 133/75, 97.8, 81, 16, 95%    Physical Exam  Constitutional:       General: She is not in acute distress.     Comments: Blind  Hard of hearing   Eyes:      Extraocular Movements: Extraocular movements intact.   Pulmonary:      Effort: Pulmonary effort is normal.   Musculoskeletal:      Cervical back: Neck supple.   Neurological:      Mental Status: She is alert.      Comments: A&O X1   Psychiatric:         Mood and Affect: Mood normal.         Behavior: Behavior is cooperative.         Assessment/Plan  Problem List Items Addressed This Visit       Severe vascular dementia with agitation     Secured unit for safety  Assist with care         Hypertension, essential     Losartan potassium  Metoprolol  Monitor blood pressure  AMIE diet         Failure to thrive in adult - Primary     Hospice for comfort care measures           Medications, treatments, and labs reviewed  Continue medications and treatments as listed in EMR    Scribe Attestation  I, Clemente Eastmanibdestini   attest that this documentation has been prepared under the direction and in the presence of Velasquez Talley MD    Provider Attestation - Scribe documentation  All medical record entries made  by the Scribe were at my direction and personally dictated by me. I have reviewed the chart and agree that the record accurately reflects my personal performance of the history, physical exam, discussion and plan.   Velasquez Talley MD            Electronically Signed By: Velasquez Talley MD   12/27/24  4:02 PM